# Patient Record
Sex: FEMALE | Race: WHITE | NOT HISPANIC OR LATINO | Employment: OTHER | URBAN - NONMETROPOLITAN AREA
[De-identification: names, ages, dates, MRNs, and addresses within clinical notes are randomized per-mention and may not be internally consistent; named-entity substitution may affect disease eponyms.]

---

## 2017-01-23 ENCOUNTER — HOSPITAL ENCOUNTER (INPATIENT)
Facility: HOSPITAL | Age: 72
LOS: 1 days | Discharge: HOME HEALTH | DRG: 065 | End: 2017-01-25
Admitting: FAMILY MEDICINE
Payer: MEDICARE

## 2017-01-23 DIAGNOSIS — H53.9 VISION CHANGES: ICD-10-CM

## 2017-01-23 DIAGNOSIS — E78.00 ELEVATED LDL CHOLESTEROL LEVEL: ICD-10-CM

## 2017-01-23 DIAGNOSIS — I63.319 CEREBROVASCULAR ACCIDENT (CVA) DUE TO THROMBOSIS OF MIDDLE CEREBRAL ARTERY, UNSPECIFIED BLOOD VESSEL LATERALITY (H): Primary | ICD-10-CM

## 2017-01-23 DIAGNOSIS — R40.4 TRANSIENT ALTERATION OF AWARENESS: ICD-10-CM

## 2017-01-23 PROBLEM — G45.9 TIA (TRANSIENT ISCHEMIC ATTACK): Status: ACTIVE | Noted: 2017-01-23

## 2017-01-23 LAB
ALBUMIN SERPL-MCNC: 3.9 G/DL (ref 3.4–5)
ALBUMIN UR-MCNC: 30 MG/DL
ALP SERPL-CCNC: 88 U/L (ref 40–150)
ALT SERPL W P-5'-P-CCNC: 14 U/L (ref 0–50)
ANION GAP SERPL CALCULATED.3IONS-SCNC: 10 MMOL/L (ref 3–14)
APPEARANCE UR: CLEAR
AST SERPL W P-5'-P-CCNC: 14 U/L (ref 0–45)
BACTERIA #/AREA URNS HPF: ABNORMAL /HPF
BASOPHILS # BLD AUTO: 0 10E9/L (ref 0–0.2)
BASOPHILS NFR BLD AUTO: 0.4 %
BILIRUB SERPL-MCNC: 1 MG/DL (ref 0.2–1.3)
BILIRUB UR QL STRIP: NEGATIVE
BUN SERPL-MCNC: 21 MG/DL (ref 7–30)
CALCIUM SERPL-MCNC: 9.3 MG/DL (ref 8.5–10.1)
CHLORIDE SERPL-SCNC: 102 MMOL/L (ref 94–109)
CO2 SERPL-SCNC: 26 MMOL/L (ref 20–32)
COLOR UR AUTO: YELLOW
CREAT SERPL-MCNC: 1.2 MG/DL (ref 0.52–1.04)
CRP SERPL-MCNC: 4.9 MG/L (ref 0–8)
DIFFERENTIAL METHOD BLD: NORMAL
EOSINOPHIL # BLD AUTO: 0 10E9/L (ref 0–0.7)
EOSINOPHIL NFR BLD AUTO: 0 %
ERYTHROCYTE [DISTWIDTH] IN BLOOD BY AUTOMATED COUNT: 13.3 % (ref 10–15)
GFR SERPL CREATININE-BSD FRML MDRD: 44 ML/MIN/1.7M2
GLUCOSE SERPL-MCNC: 101 MG/DL (ref 70–99)
GLUCOSE UR STRIP-MCNC: NEGATIVE MG/DL
HCT VFR BLD AUTO: 42.4 % (ref 35–47)
HGB BLD-MCNC: 14.7 G/DL (ref 11.7–15.7)
HGB UR QL STRIP: ABNORMAL
IMM GRANULOCYTES # BLD: 0 10E9/L (ref 0–0.4)
IMM GRANULOCYTES NFR BLD: 0.2 %
KETONES UR STRIP-MCNC: 5 MG/DL
LEUKOCYTE ESTERASE UR QL STRIP: NEGATIVE
LYMPHOCYTES # BLD AUTO: 1.6 10E9/L (ref 0.8–5.3)
LYMPHOCYTES NFR BLD AUTO: 19.5 %
MCH RBC QN AUTO: 30.6 PG (ref 26.5–33)
MCHC RBC AUTO-ENTMCNC: 34.7 G/DL (ref 31.5–36.5)
MCV RBC AUTO: 88 FL (ref 78–100)
MONOCYTES # BLD AUTO: 0.8 10E9/L (ref 0–1.3)
MONOCYTES NFR BLD AUTO: 9.8 %
MUCOUS THREADS #/AREA URNS LPF: PRESENT /LPF
NEUTROPHILS # BLD AUTO: 5.9 10E9/L (ref 1.6–8.3)
NEUTROPHILS NFR BLD AUTO: 70.1 %
NITRATE UR QL: NEGATIVE
NRBC # BLD AUTO: 0 10*3/UL
NRBC BLD AUTO-RTO: 0 /100
PH UR STRIP: 5.5 PH (ref 4.7–8)
PLATELET # BLD AUTO: 233 10E9/L (ref 150–450)
POTASSIUM SERPL-SCNC: 3.6 MMOL/L (ref 3.4–5.3)
PROT SERPL-MCNC: 8 G/DL (ref 6.8–8.8)
RBC # BLD AUTO: 4.8 10E12/L (ref 3.8–5.2)
RBC #/AREA URNS AUTO: 2 /HPF (ref 0–2)
SODIUM SERPL-SCNC: 138 MMOL/L (ref 133–144)
SP GR UR STRIP: 1.02 (ref 1–1.03)
SQUAMOUS #/AREA URNS AUTO: 3 /HPF (ref 0–1)
TSH SERPL DL<=0.005 MIU/L-ACNC: 2.7 MU/L (ref 0.4–4)
URN SPEC COLLECT METH UR: ABNORMAL
UROBILINOGEN UR STRIP-MCNC: NORMAL MG/DL (ref 0–2)
WBC # BLD AUTO: 8.4 10E9/L (ref 4–11)
WBC #/AREA URNS AUTO: 2 /HPF (ref 0–2)

## 2017-01-23 PROCEDURE — 96361 HYDRATE IV INFUSION ADD-ON: CPT

## 2017-01-23 PROCEDURE — 84443 ASSAY THYROID STIM HORMONE: CPT

## 2017-01-23 PROCEDURE — 25000128 H RX IP 250 OP 636: Performed by: FAMILY MEDICINE

## 2017-01-23 PROCEDURE — A9270 NON-COVERED ITEM OR SERVICE: HCPCS | Mod: GY | Performed by: FAMILY MEDICINE

## 2017-01-23 PROCEDURE — 70498 CT ANGIOGRAPHY NECK: CPT | Mod: TC

## 2017-01-23 PROCEDURE — G0378 HOSPITAL OBSERVATION PER HR: HCPCS

## 2017-01-23 PROCEDURE — 99284 EMERGENCY DEPT VISIT MOD MDM: CPT

## 2017-01-23 PROCEDURE — 25000128 H RX IP 250 OP 636

## 2017-01-23 PROCEDURE — 70496 CT ANGIOGRAPHY HEAD: CPT | Mod: TC

## 2017-01-23 PROCEDURE — 40000786 ZZHCL STATISTIC ACTIVE MRSA SURVEILLANCE CULTURE: Performed by: FAMILY MEDICINE

## 2017-01-23 PROCEDURE — 96360 HYDRATION IV INFUSION INIT: CPT

## 2017-01-23 PROCEDURE — 80053 COMPREHEN METABOLIC PANEL: CPT

## 2017-01-23 PROCEDURE — 36415 COLL VENOUS BLD VENIPUNCTURE: CPT

## 2017-01-23 PROCEDURE — 85025 COMPLETE CBC W/AUTO DIFF WBC: CPT

## 2017-01-23 PROCEDURE — 25000132 ZZH RX MED GY IP 250 OP 250 PS 637: Mod: GY | Performed by: FAMILY MEDICINE

## 2017-01-23 PROCEDURE — 93880 EXTRACRANIAL BILAT STUDY: CPT | Mod: TC

## 2017-01-23 PROCEDURE — 86140 C-REACTIVE PROTEIN: CPT

## 2017-01-23 PROCEDURE — 81001 URINALYSIS AUTO W/SCOPE: CPT

## 2017-01-23 PROCEDURE — 99285 EMERGENCY DEPT VISIT HI MDM: CPT | Mod: 25

## 2017-01-23 RX ORDER — LEVOTHYROXINE SODIUM 50 UG/1
50 TABLET ORAL DAILY
Status: DISCONTINUED | OUTPATIENT
Start: 2017-01-23 | End: 2017-01-25 | Stop reason: HOSPADM

## 2017-01-23 RX ORDER — SODIUM CHLORIDE 9 MG/ML
INJECTION, SOLUTION INTRAVENOUS CONTINUOUS
Status: DISCONTINUED | OUTPATIENT
Start: 2017-01-23 | End: 2017-01-23

## 2017-01-23 RX ORDER — FUROSEMIDE 40 MG
40 TABLET ORAL 2 TIMES DAILY
COMMUNITY

## 2017-01-23 RX ORDER — BUSPIRONE HYDROCHLORIDE 15 MG/1
30 TABLET ORAL EVERY MORNING
Status: DISCONTINUED | OUTPATIENT
Start: 2017-01-24 | End: 2017-01-25 | Stop reason: HOSPADM

## 2017-01-23 RX ORDER — ONDANSETRON 2 MG/ML
4 INJECTION INTRAMUSCULAR; INTRAVENOUS EVERY 6 HOURS PRN
Status: DISCONTINUED | OUTPATIENT
Start: 2017-01-23 | End: 2017-01-25 | Stop reason: HOSPADM

## 2017-01-23 RX ORDER — NALOXONE HYDROCHLORIDE 0.4 MG/ML
.1-.4 INJECTION, SOLUTION INTRAMUSCULAR; INTRAVENOUS; SUBCUTANEOUS
Status: DISCONTINUED | OUTPATIENT
Start: 2017-01-23 | End: 2017-01-25 | Stop reason: HOSPADM

## 2017-01-23 RX ORDER — SIMVASTATIN 40 MG
40 TABLET ORAL AT BEDTIME
Status: DISCONTINUED | OUTPATIENT
Start: 2017-01-23 | End: 2017-01-24

## 2017-01-23 RX ORDER — ALLOPURINOL 300 MG/1
300 TABLET ORAL DAILY
Status: DISCONTINUED | OUTPATIENT
Start: 2017-01-23 | End: 2017-01-25 | Stop reason: HOSPADM

## 2017-01-23 RX ORDER — ONDANSETRON 4 MG/1
4 TABLET, ORALLY DISINTEGRATING ORAL EVERY 6 HOURS PRN
Status: DISCONTINUED | OUTPATIENT
Start: 2017-01-23 | End: 2017-01-25 | Stop reason: HOSPADM

## 2017-01-23 RX ORDER — FUROSEMIDE 40 MG
40 TABLET ORAL 2 TIMES DAILY
Status: DISCONTINUED | OUTPATIENT
Start: 2017-01-23 | End: 2017-01-25 | Stop reason: HOSPADM

## 2017-01-23 RX ORDER — ACETAMINOPHEN 325 MG/1
650 TABLET ORAL EVERY 4 HOURS PRN
Status: DISCONTINUED | OUTPATIENT
Start: 2017-01-23 | End: 2017-01-25 | Stop reason: HOSPADM

## 2017-01-23 RX ORDER — SODIUM CHLORIDE 9 MG/ML
INJECTION, SOLUTION INTRAVENOUS CONTINUOUS
Status: DISCONTINUED | OUTPATIENT
Start: 2017-01-23 | End: 2017-01-24

## 2017-01-23 RX ORDER — IOPAMIDOL 612 MG/ML
100 INJECTION, SOLUTION INTRAVASCULAR ONCE
Status: COMPLETED | OUTPATIENT
Start: 2017-01-23 | End: 2017-01-23

## 2017-01-23 RX ADMIN — SIMVASTATIN 40 MG: 40 TABLET, FILM COATED ORAL at 21:21

## 2017-01-23 RX ADMIN — SERTRALINE HYDROCHLORIDE 50 MG: 50 TABLET ORAL at 19:06

## 2017-01-23 RX ADMIN — SODIUM CHLORIDE: 9 INJECTION, SOLUTION INTRAVENOUS at 11:48

## 2017-01-23 RX ADMIN — FUROSEMIDE 40 MG: 40 TABLET ORAL at 21:21

## 2017-01-23 RX ADMIN — LEVOTHYROXINE SODIUM 50 MCG: 50 TABLET ORAL at 19:05

## 2017-01-23 RX ADMIN — SODIUM CHLORIDE: 9 INJECTION, SOLUTION INTRAVENOUS at 21:45

## 2017-01-23 RX ADMIN — ALLOPURINOL 300 MG: 300 TABLET ORAL at 19:05

## 2017-01-23 RX ADMIN — IOPAMIDOL 75 ML: 612 INJECTION, SOLUTION INTRAVASCULAR at 13:02

## 2017-01-23 ASSESSMENT — ENCOUNTER SYMPTOMS
NECK STIFFNESS: 0
COLOR CHANGE: 0
DIFFICULTY URINATING: 0
ARTHRALGIAS: 0
HEADACHES: 0
FEVER: 0
ABDOMINAL PAIN: 0
EYE REDNESS: 0
CONFUSION: 1
SPEECH DIFFICULTY: 1
SHORTNESS OF BREATH: 0

## 2017-01-23 ASSESSMENT — VISUAL ACUITY: OU: NORMAL ACUITY

## 2017-01-23 NOTE — IP AVS SNAPSHOT
[facility]    [address] [place] [id number]    Phone:  [phone]    Fax:  [phone]                                       After Visit Summary   1/23/2017    Vanesa Flemign    MRN: [id number]           After Visit Summary Signature Page     I have received my discharge instructions, and my questions have been answered. I have discussed any challenges I see with this plan with the nurse or doctor.    ..........................................................................................................................................  Patient/Patient Representative Signature      ..........................................................................................................................................  Patient Representative Print Name and Relationship to Patient    ..................................................               ................................................  Date                                            Time    ..........................................................................................................................................  Reviewed by Signature/Title    ...................................................              ..............................................  Date                                                            Time

## 2017-01-23 NOTE — IP AVS SNAPSHOT
MRN:[id number]                      After Visit Summary   1/23/2017    Vanesa Fleming    MRN: [id number]           Thank you!     Thank you for choosing [facility] for your care. Our goal is always to provide you with excellent care. Hearing back from our patients is one way we can continue to improve our services. Please take a few minutes to complete the written survey that you may receive in the mail after you visit with us. Thank you!        Patient Information     Date Of Birth          1945        About your hospital stay     You were admitted on:  January 23, 2017 You last received care in the:  [facility]    You were discharged on:  January 25, 2017       Who to Call     For medical emergencies, please call 911.  For non-urgent questions about your medical care, please call your primary care provider or clinic, [phone]          Attending Provider     Provider    Roberta Mccain APRN FNP Hilde-Philips, Amy K, MD       Primary Care Provider Office Phone # Fax #    Sandra Vanegas [phone]       [facility] [address] [facility] [address]        Follow-up Appointments     Follow-up and recommended labs and tests        F/u sandra vanegas 1 wk                  Additional Services     Home Care SLP Referral for Hospital Discharge       SLP to eval and treat    Your provider has ordered home care - speech therapy. If you have not been contacted within 2 days of your discharge please call the department phone number listed on the top of this document.            Home care nursing referral       RN extended hours visit. RN to assess respiratory and cardiac status and patients ability to take and record daily blood pressure, temp and weight.  RN to teach n/a.    Your provider has ordered home care nursing services. If you have not been contacted within 2 days of your discharge please call the inpatient department phone number at  [phone] .                  Further instructions from your care team       What to expect when you have contrast    During your exam, we will inject  contrast  into your vein or artery. (Contrast is a clear liquid with iodine in it. It shows up on X-rays.)    You may feel warm or hot. You may have a metal taste in your mouth and a slight upset stomach. You may also feel pressure near the kidneys and bladder. These effects will last about 1 to 3 minutes.    Please tell us if you have:    Sneezing     Itching    Hives     Swelling in the face    A hoarse voice    Breathing problems    Other new symptoms    Serious problems are rare.  They may include:    Irregular heartbeat     Seizures    Kidney failure              Tissue damage    Shock      Death    If you have any problems during the exam, we  will treat them right away.    When you get home    Call your hospital if you have any new symptoms in the next 2 days, like hives or swelling. (Phone numbers are at the bottom of this page.) Or call your family doctor.     If you have wheezing or trouble breathing, call 911.    Self-care  -Drink at least 4 extra glasses of water today.   This reduces the stress on your kidneys.  -Keep taking your regular medicines.    The contrast will pass out of your body in your  Urine(pee). This will happen in the next 24 hours. You  will not feel this. Your urine will not  change color.    If you have kidney problems or take metformin    Drink 4 to 8 large glasses of water for the next  2 days, if you are not on a fluid restriction.    ?If you take metformin (Glucophage or Glucovance) for diabetes, keep taking it.      ?Your kidney function tests are abnormal.  If you take Metformin, do not take it for 48 hours. Please go to your clinic for a blood test within 3 days after your exam before the restarting this medicine.     (Note to provider:please give patient prescription for lab tests.)    ?Special instructions: ***    I have  read and understand the above information.    Patient Sign Here:______________________________________Date:________Time:______    Staff Sign Here:________________________________________Date:_______Time:______      Radiology Departments:     ?[facility]: [phone] ?[facility]: [phone]     ?[facility]: [phone] ?[facility]:[phone]      ?[facility]: [phone]  ?[facility]: [phone]  ?[facility]:[phone]    ?[facility]:[phone]  ?[facility]:[phone] Contrast Discharge Instructions    The IV contrast you received today will pass out of your body in your  urine. This will happen in the next 24 hours. You will not feel this process.  Your urine will not change color.    Drink at least 4 extra glasses of water or juice today (unless your doctor  has restricted your fluids). This reduces the stress on your kidneys.  You may take your regular medicines.    If you are on dialysis: It is best to have dialysis today.    If you have a reaction: Most reactions happen right away. If you have  any new symptoms after leaving the hospital (such as hives or swelling),  call your hospital at the correct number below. Or call your family doctor.  If you have breathing distress or wheezing, call 911.    Special instructions: ***    I have read and understand the above information.    Signature:______________________________________ Date:___3-61-42________    Staff:__________________________________________ Date:___________  You have a follow up appointment with Felipa Steven on  at 10:40 in the morning thank you.    Time:__________    [facility] Departments:    ___[facility]: [phone]  ___[facility]: [phone]  ___[facility]: [phone] ___[facility]: [phone]  ___[facility]: [phone]  ___[facility]: [phone]  ___[phone]  ___[facility]: [phone]  ___[phone]    Pending Results     Date and Time Order Name  "Status Description    2017 1816 Echocardiogram Preliminary             Statement of Approval     Ordered          17 0742  I have reviewed and agree with all the recommendations and orders detailed in this document.   EFFECTIVE NOW     Comments:  F/u sandra QUINTANILLA 1 wk   Approved and electronically signed by:  Ana Ryan MD             Admission Information        Provider Department Dept Phone    2017 Ana Ryan MD [facility] [phone]      Your Vitals Were     Blood Pressure Pulse Temperature    153/82 mmHg 72 96.7  F (35.9  C) (Tympanic)    Respirations Height Weight    16 1.524 m (5') 67.9 kg (149 lb 11.1 oz)    BMI (Body Mass Index) Pulse Oximetry       29.23 kg/m2 95%       MyChart Information     [employer] lets you send messages to your doctor, view your test results, renew your prescriptions, schedule appointments and more. To sign up, go to [email or web address] . Click on \"Log in\" on the left side of the screen, which will take you to the Welcome page. Then click on \"Sign up Now\" on the right side of the page.     You will be asked to enter the access code listed below, as well as some personal information. Please follow the directions to create your username and password.     Your access code is: [email or web address]  Expires: 2017  8:50 AM     Your access code will  in 90 days. If you need help or a new code, please call your [facility] clinic or [phone].        Care EveryWhere ID     This is your Care EveryWhere ID. This could be used by other organizations to access your [facility] medical records  [id number]           Review of your medicines      START taking        Dose / Directions    atorvastatin 80 MG tablet   Commonly known as:  LIPITOR   Used for:  Elevated LDL cholesterol level        Dose:  80 mg   Take 1 tablet (80 mg) by mouth daily   Quantity:  90 tablet   Refills:  3         CONTINUE these medicines which may have CHANGED, or have new " prescriptions. If we are uncertain of the size of tablets/capsules you have at home, strength may be listed as something that might have changed.        Dose / Directions    aspirin 325 MG tablet   This may have changed:    - medication strength  - how much to take   Used for:  Cerebrovascular accident (CVA) due to thrombosis of middle cerebral artery, unspecified blood vessel laterality (H)        Dose:  325 mg   Take 1 tablet (325 mg) by mouth daily   Refills:  0         CONTINUE these medicines which have NOT CHANGED        Dose / Directions    ALLOPURINOL PO        Dose:  300 mg   Take 300 mg by mouth daily   Refills:  0       BUSPIRONE HCL PO        Dose:  30 mg   Take 30 mg by mouth   Refills:  0       LASIX 40 MG tablet   Generic drug:  furosemide        Dose:  40 mg   Take 40 mg by mouth 2 times daily   Refills:  0       LEVOTHYROXINE SODIUM PO        Dose:  50 mcg   Take 50 mcg by mouth daily   Refills:  0       METOPROLOL SUCCINATE ER PO        Dose:  50 mg   Take 50 mg by mouth daily   Refills:  0       SERTRALINE HCL PO        Dose:  50 mg   Take 50 mg by mouth daily   Refills:  0         STOP taking     LISINOPRIL PO           SIMVASTATIN PO                Where to get your medicines      These medications were sent to [id number] - [place], [place] - [address] AT [facility] [address], [facility] [address]     Phone:  [phone]    - atorvastatin 80 MG tablet      Some of these will need a paper prescription and others can be bought over the counter. Ask your nurse if you have questions.     You don't need a prescription for these medications    - aspirin 325 MG tablet             Protect others around you: Learn how to safely use, store and throw away your medicines at www.disposemymeds.org.             Medication List: This is a list of all your medications and when to take them. Check marks below indicate your daily home schedule. Keep this list as a reference.       Medications           Morning Afternoon Evening Bedtime As Needed    ALLOPURINOL PO   Take 300 mg by mouth daily   Last time this was given:  300 mg on 1/25/2017  8:34 AM                                aspirin 325 MG tablet   Take 1 tablet (325 mg) by mouth daily                                atorvastatin 80 MG tablet   Commonly known as:  LIPITOR   Take 1 tablet (80 mg) by mouth daily   Last time this was given:  80 mg on 1/24/2017  8:36 PM                                BUSPIRONE HCL PO   Take 30 mg by mouth   Last time this was given:  30 mg on 1/25/2017  8:33 AM                                LASIX 40 MG tablet   Take 40 mg by mouth 2 times daily   Last time this was given:  40 mg on 1/25/2017  8:34 AM   Generic drug:  furosemide                                LEVOTHYROXINE SODIUM PO   Take 50 mcg by mouth daily   Last time this was given:  50 mcg on 1/25/2017  9:46 AM                                METOPROLOL SUCCINATE ER PO   Take 50 mg by mouth daily                                SERTRALINE HCL PO   Take 50 mg by mouth daily   Last time this was given:  50 mg on 1/25/2017  8:34 AM                                          More Information        Altered Level of Consciousness  Level of consciousness (LOC) is a measure of a person s ability to interact with other people and to react to the surroundings. A person with an altered level of consciousness may not respond to touch or voices. He or she may look vacant or blank and may not make eye contact with others. He or she may be limp and may not move for a long time or show little interest in moving. He or she may also be confused.  There are many causes of altered LOC. They include low blood sugar, infection, medicines, injuries, or other medical problems    Altered LOC is a medical emergency. The healthcare provider will do tests to help find the cause. These may include blood tests and imaging tests. The person is treated so breathing and heart rate are  stable. An intravenous (IV) line may be put into a vein in the arm or hand to give medicines. Once the cause of altered LOC is found, the goal is to treat the cause.   In almost all cases, the person will be admitted to the hospital for observation.  Home care  When your loved one is released from the hospital, you will be given guidelines for caring for him or her. In general:    Follow the healthcare provider's instructions for giving any prescribed medicines to your child.     Stay with your loved one or have another responsible adult look after him or her. Watch carefully for any return of symptoms or changes in behavior.    If the person has diabetes, make sure that any approved medicines are given on time and as prescribed.  Follow-up care  Follow up with the healthcare provider or our staff.  When to seek medical advice  Call the healthcare provider right away for any of the following:    Symptoms of altered LOC return    New symptoms appear    3890-1539 [employer]. [address] [place] [address]. All rights reserved. This information is not intended as a substitute for professional medical care. Always follow your healthcare professional's instructions.                Stroke (Completed)    You have had a mild stroke, or cerebrovascular accident (CVA). This is caused by a loss of blood flow to part of your brain. This can occur when a blood clot forms inside the carotid artery (main artery from the heart to the brain) or inside the heart. When the clot travels to the brain, it can lodge in a blood vessel and block blood flow. The other common cause of stroke is a gradual narrowing of the arteries in the brain due to buildup of fatty deposits (plaque).  Symptoms  Blocked blood flow in different areas of the brain can cause different symptoms. If you have had a stroke before, a new one may be different. A memory aid for the basic signs of a stroke is F.A.S.T.  F.A.S.T.    F: Face  drooping, or numbness on one side. This may be more noticeable when you ask the affected person to smile.    A: Arm weakness or numbness. The affected person may have trouble using or lifting one side.    S: Speech difficulty. Speech may be slurred or hard to understand. The affected person may also use the wrong words.    T: Time to call 911. Time is critical in treating a stroke. Call 911 as soon as you suspect a stroke has happened--even a small one. The sooner treatment is started the better, even if the symptoms go away.  Other common symptoms of a stroke include:    Having difficulty getting the right words to come out    Weakness in one leg    Numbness on one side    Difficulty walking    Trouble with coordination    Trouble with vision    Headache    Confusion    Dizziness  Treatment  After you have had a stroke, you are at risk of having another. Be sure to follow up with your healthcare provider for further evaluation and treatment. If problems are found, your healthcare provider will recommend treatment with medicines and/or procedures.  To reduce your chance of having another stroke, you may be prescribed medicines. These include medicines to prevent blood clots, such as antiplatelet or anticoagulant medicines.  Home care    Rest at home and avoid exertion for the next few days.    If your healthcare provider has prescribed medicines, take them as directed.  Follow-up care  Follow up with your healthcare provider, or as advised. Additional tests may be needed. If you had an X-ray, CT scan, MRI, or ECG (electrocardiogram), it will be reviewed by a specialist. You will be notified of any new findings that will affect your care.  Call 911   Contact emergency services right away if any of these occur:    Any of your stroke symptoms worsen    New problems with speech, confusion, vision, walking, coordination, facial droop, or weakness or numbness on one side of your body    Severe headache, fainting spell,  dizziness, or seizure    Chest pain or shortness of breath  Remember F.A.S.T. (described above). If you notice warning signs and symptoms of stroke, CALL 911 without delay.    7046-8800 [employer]. [address] [place] [address], [place] [address]. All rights reserved. This information is not intended as a substitute for professional medical care. Always follow your healthcare professional's instructions.                Atorvastatin Calcium Oral tablet  What is this medicine?  ATORVASTATIN (a TORE va sta tin) is known as a HMG-CoA reductase inhibitor or 'statin'. It lowers the level of cholesterol and triglycerides in the blood. This drug may also reduce the risk of heart attack, stroke, or other health problems in patients with risk factors for heart disease. Diet and lifestyle changes are often used with this drug.  This medicine may be used for other purposes; ask your health care provider or pharmacist if you have questions.  What should I tell my health care provider before I take this medicine?  They need to know if you have any of these conditions:    frequently drink alcoholic beverages    history of stroke, TIA    kidney disease    liver disease    muscle aches or weakness    other medical condition    an unusual or allergic reaction to atorvastatin, other medicines, foods, dyes, or preservatives    pregnant or trying to get pregnant    breast-feeding  How should I use this medicine?  Take this medicine by mouth with a glass of water. Follow the directions on the prescription label. You can take this medicine with or without food. Take your doses at regular intervals. Do not take your medicine more often than directed.  Talk to your pediatrician regarding the use of this medicine in children. While this drug may be prescribed for children as young as 10 years old for selected conditions, precautions do apply.  Overdosage: If you think you have taken too much of this medicine contact a poison control  center or emergency room at once.  NOTE: This medicine is only for you. Do not share this medicine with others.  What if I miss a dose?  If you miss a dose, take it as soon as you can. If it is almost time for your next dose, take only that dose. Do not take double or extra doses.  What may interact with this medicine?  Do not take this medicine with any of the following medications:    red yeast rice    telaprevir    telithromycin    voriconazole  This medicine may also interact with the following medications:    alcohol    antiviral medicines for HIV or AIDS    boceprevir    certain antibiotics like clarithromycin, erythromycin, troleandomycin    certain medicines for cholesterol like fenofibrate or gemfibrozil    cimetidine    clarithromycin    colchicine    cyclosporine    digoxin    female hormones, like estrogens or progestins and birth control pills    grapefruit juice    medicines for fungal infections like fluconazole, itraconazole, ketoconazole    niacin    rifampin    spironolactone  This list may not describe all possible interactions. Give your health care provider a list of all the medicines, herbs, non-prescription drugs, or dietary supplements you use. Also tell them if you smoke, drink alcohol, or use illegal drugs. Some items may interact with your medicine.  What should I watch for while using this medicine?  Visit your doctor or health care professional for regular check-ups. You may need regular tests to make sure your liver is working properly.  Tell your doctor or health care professional right away if you get any unexplained muscle pain, tenderness, or weakness, especially if you also have a fever and tiredness. Your doctor or health care professional may tell you to stop taking this medicine if you develop muscle problems. If your muscle problems do not go away after stopping this medicine, contact your health care professional.  This drug is only part of a total heart-health program. Your  doctor or a dietician can suggest a low-cholesterol and low-fat diet to help. Avoid alcohol and smoking, and keep a proper exercise schedule.  Do not use this drug if you are pregnant or breast-feeding. Serious side effects to an unborn child or to an infant are possible. Talk to your doctor or pharmacist for more information.  This medicine may affect blood sugar levels. If you have diabetes, check with your doctor or health care professional before you change your diet or the dose of your diabetic medicine.  If you are going to have surgery tell your health care professional that you are taking this drug.  What side effects may I notice from receiving this medicine?  Side effects that you should report to your doctor or health care professional as soon as possible:    allergic reactions like skin rash, itching or hives, swelling of the face, lips, or tongue    dark urine    fever    joint pain    muscle cramps, pain    redness, blistering, peeling or loosening of the skin, including inside the mouth    trouble passing urine or change in the amount of urine    unusually weak or tired    yellowing of eyes or skin  Side effects that usually do not require medical attention (report to your doctor or health care professional if they continue or are bothersome):    constipation    heartburn    stomach gas, pain, upset  This list may not describe all possible side effects. Call your doctor for medical advice about side effects. You may report side effects to FDA at 9-556-FDA-2502.  Where should I keep my medicine?  Keep out of the reach of children.  Store at room temperature between 20 to 25 degrees C (68 to 77 degrees F). Throw away any unused medicine after the expiration date.  NOTE:This sheet is a summary. It may not cover all possible information. If you have questions about this medicine, talk to your doctor, pharmacist, or health care provider. Copyright  2016 [employer]                Patient  Education    Aspirin Chewable tablet    Aspirin Chewing-gum, medicated    Aspirin Gastro-resistant tablet    Aspirin Oral tablet    Aspirin Oral tablet, extended-release    Aspirin Rectal suppository  Aspirin Oral tablet  What is this medicine?  ASPIRIN (AS pir in) is a pain reliever. It is used to treat mild pain and fever. This medicine is also used as directed by a doctor to prevent and to treat heart attacks, to prevent strokes, and to treat arthritis or inflammation.  This medicine may be used for other purposes; ask your health care provider or pharmacist if you have questions.  What should I tell my health care provider before I take this medicine?  They need to know if you have any of these conditions:    anemia    asthma    bleeding problems    child with chickenpox, the flu, or other viral infection    diabetes    gout    if you frequently drink alcohol containing drinks    kidney disease    liver disease    low level of vitamin K    lupus    smoke tobacco    stomach ulcers or other problems    an unusual or allergic reaction to aspirin, tartrazine dye, other medicines, dyes, or preservatives    pregnant or trying to get pregnant    breast-feeding  How should I use this medicine?  Take this medicine by mouth with a glass of water. Follow the directions on the package or prescription label. You can take this medicine with or without food. If it upsets your stomach, take it with food. Do not take your medicine more often than directed.  Talk to your pediatrician regarding the use of this medicine in children. While this drug may be prescribed for children as young as 12 years of age for selected conditions, precautions do apply. Children and teenagers should not use this medicine to treat chicken pox or flu symptoms unless directed by a doctor.  Patients over 65 years old may have a stronger reaction and need a smaller dose.  Overdosage: If you think you have taken too much of this medicine contact a poison  control center or emergency room at once.  NOTE: This medicine is only for you. Do not share this medicine with others.  What if I miss a dose?  If you are taking this medicine on a regular schedule and miss a dose, take it as soon as you can. If it is almost time for your next dose, take only that dose. Do not take double or extra doses.  What may interact with this medicine?  Do not take this medicine with any of the following medications:    cidofovir    ketorolac    probenecid  This medicine may also interact with the following medications:    alcohol    alendronate    bismuth subsalicylate    flavocoxid    herbal supplements like feverfew, garlic, cris, ginkgo biloba, horse chestnut    medicines for diabetes or glaucoma like acetazolamide, methazolamide    medicines for gout    medicines that treat or prevent blood clots like enoxaparin, heparin, ticlopidine, warfarin    other aspirin and aspirin-like medicines    NSAIDs, medicines for pain and inflammation, like ibuprofen or naproxen    pemetrexed    sulfinpyrazone    varicella live vaccine  This list may not describe all possible interactions. Give your health care provider a list of all the medicines, herbs, non-prescription drugs, or dietary supplements you use. Also tell them if you smoke, drink alcohol, or use illegal drugs. Some items may interact with your medicine.  What should I watch for while using this medicine?  If you are treating yourself for pain, tell your doctor or health care professional if the pain lasts more than 10 days, if it gets worse, or if there is a new or different kind of pain. Tell your doctor if you see redness or swelling. Also, check with your doctor if you have a fever that lasts for more than 3 days. Only take this medicine to prevent heart attacks or blood clotting if prescribed by your doctor or health care professional.  Do not take aspirin or aspirin-like medicines with this medicine. Too much aspirin can be  dangerous. Always read the labels carefully.  This medicine can irritate your stomach or cause bleeding problems. Do not smoke cigarettes or drink alcohol while taking this medicine. Do not lie down for 30 minutes after taking this medicine to prevent irritation to your throat.  If you are scheduled for any medical or dental procedure, tell your healthcare provider that you are taking this medicine. You may need to stop taking this medicine before the procedure.  This medicine may be used to treat migraines. If you take migraine medicines for 10 or more days a month, your migraines may get worse. Keep a diary of headache days and medicine use. Contact your healthcare professional if your migraine attacks occur more frequently.  What side effects may I notice from receiving this medicine?  Side effects that you should report to your doctor or health care professional as soon as possible:    allergic reactions like skin rash, itching or hives, swelling of the face, lips, or tongue    breathing problems    changes in hearing, ringing in the ears    confusion    general ill feeling or flu-like symptoms    pain on swallowing    redness, blistering, peeling or loosening of the skin, including inside the mouth or nose    signs and symptoms of bleeding such as bloody or black, tarry stools; red or dark-brown urine; spitting up blood or brown material that looks like coffee grounds; red spots on the skin; unusual bruising or bleeding from the eye, gums, or nose    trouble passing urine or change in the amount of urine    unusually weak or tired    yellowing of the eyes or skin  Side effects that usually do not require medical attention (report to your doctor or health care professional if they continue or are bothersome):    diarrhea or constipation    headache    nausea, vomiting    stomach gas, heartburn  This list may not describe all possible side effects. Call your doctor for medical advice about side effects. You may  report side effects to FDA at 9-684-FDA-6732.  Where should I keep my medicine?  Keep out of the reach of children.  Store at room temperature between 15 and 30 degrees C (59 and 86 degrees F). Protect from heat and moisture. Do not use this medicine if it has a strong vinegar smell. Throw away any unused medicine after the expiration date.  NOTE:This sheet is a summary. It may not cover all possible information. If you have questions about this medicine, talk to your doctor, pharmacist, or health care provider. Copyright  2016 Gold Standard

## 2017-01-23 NOTE — ED NOTES
"[facility] notified that pt was unable to see peripherally out of right side and that she was unable to write anything for registration; family stated this was not her \"norm\"  "

## 2017-01-23 NOTE — PROGRESS NOTES
Preliminary results for STAT imaging were received at 1355 (time on fax or preliminary report).    Preliminary results for STAT imaging were given to [facility] (time) and scanned into PACs at 1355 (time).

## 2017-01-23 NOTE — ED PROVIDER NOTES
History     Chief Complaint   Patient presents with     Diarrhea     Per son states pt said she has been sick the last couple days.  told him diarrhea. Pt denies sx other than is confused.      HPI  Vanesa Fleming is a 71 year old female, resident of [facility], new to this facility, presents to ED via EMS for evaluation of acute onset visual changes, confusion. Son, at bedside, states he visited his mother 2 days ago, at that time was in her normal state of health, other than c/o diarrhea. Today son noted confusion, difficulty finding words, unable to write, right sided peripheral visual change. Pt denies pain, no fever, no cough or cold sx.     I have reviewed the Medications, Allergies, Past Medical and Surgical History, and Social History in the Epic system.    Review of Systems   Constitutional: Negative for fever.   HENT: Negative for congestion.    Eyes: Positive for visual disturbance. Negative for redness.   Respiratory: Negative for shortness of breath.    Cardiovascular: Negative for chest pain.   Gastrointestinal: Positive for diarrhea. Negative for abdominal pain.   Genitourinary: Negative for difficulty urinating.   Musculoskeletal: Negative for arthralgias and neck stiffness.   Skin: Negative for color change.   Neurological: Positive for speech difficulty. Negative for headaches.        Searching for words   Psychiatric/Behavioral: Positive for confusion.       Physical Exam   BP: (!) 189/95 mmHg  Pulse: 84  Heart Rate: 81  Temp: 100  F (37.8  C)  Resp: 18  SpO2: 98 %  Physical Exam   Constitutional: No distress.   HENT:   Head: Normocephalic.   Eyes: Conjunctivae and EOM are normal. Pupils are equal, round, and reactive to light.       Right lateral visual deficit   Neck: Normal range of motion. Neck supple.   Cardiovascular: Normal rate and regular rhythm.    Pulmonary/Chest: Effort normal and breath sounds normal. No respiratory distress.   Abdominal: Soft. Bowel sounds are normal. She  exhibits no distension.   Musculoskeletal: Normal range of motion.   Neurological: She is alert. No cranial nerve deficit or sensory deficit. GCS eye subscore is 4. GCS verbal subscore is 5. GCS motor subscore is 6.   Skin: Skin is warm and dry. She is not diaphoretic.   Psychiatric: Her mood appears anxious. Her speech is tangential. Cognition and memory are impaired. She exhibits abnormal recent memory.   Nursing note and vitals reviewed.      ED Course   Procedures     Labs Ordered and Resulted from Time of ED Arrival Up to the Time of Departure from the ED   UA MACROSCOPIC WITH REFLEX TO MICRO AND CULTURE - Abnormal; Notable for the following:     Ketones Urine 5 (*)     Blood Urine Trace (*)     Protein Albumin Urine 30 (*)     Bacteria Urine Few (*)     Squamous Epithelial /HPF Urine 3 (*)     Mucous Urine Present (*)     All other components within normal limits   COMPREHENSIVE METABOLIC PANEL - Abnormal; Notable for the following:     Glucose 101 (*)     Creatinine 1.20 (*)     GFR Estimate 44 (*)     GFR Estimate If Black 53 (*)     All other components within normal limits   CBC WITH PLATELETS DIFFERENTIAL   CRP INFLAMMATION   TSH WITH FREE T4 REFLEX   ROUTINE UA WITH MICROSCOPIC     FINDINGS:  The brachiocephalic artery is widely patent.  The right  vertebral artery appears normal.  The right common carotid artery is  tortuous.  There is heavy calcific plaquing seen in the proximal  internal carotid artery with a minimal luminal diameter of 2.3 mm.  The internal carotid artery beyond the bifurcation measured 5 mm.  This would indicate 50% stenosis in the proximal internal carotid  artery.  The internal carotid artery is widely patent to the skull  base.    The left common carotid artery is tortuous at the left carotid  bifurcation.  In the internal carotid artery minimal luminal diameter  measured 1.9 mm.  Beyond the bifurcation the internal carotid artery  measures 6 mm.  This would be consistent with  approximately 70%  stenosis in the proximal internal carotid artery.  The left subclavian  artery is widely patent.  The left vertebral artery is tortuous  proximally and has some mild calcific plaquing with less than 50%  stenosis.    The apices of the lungs are clear.  The thyroid and larynx appear  normal.  Cervical lymph nodes appear normal.  Parotid and  submandibular glands appear normal.  Paranasal sinuses are clear.    CT ANGIOGRAM OF THE HEAD    FINDINGS:  CT angiogram of the brain reveals the distal vertebral,  basilar, superior cerebellar and posterior cerebral branches to be  normal.  Both carotid siphons demonstrate mild plaquing.  The  horizontal portions of the anterior and middle cerebral artery appear  normal.  No intracranial stenoses, aneurysms or vascular shifts are  noted.    The ventricular shift is normal in size.  There are no masses,  ventricular shifts or extracerebral collections.    IMPRESSION:  NEGATIVE CT ANGIOGRAM OF THE BRAIN.  THERE IS A 50%  STENOSIS IN THE PROXIMAL INTERNAL CAROTID ARTERY ON THE RIGHT AND 70%  STENOSIS IN THE PROXIMAL INTERNAL CAROTID ARTERY ON THE LEFT.        SIGNATURE PAGE ONLY  Exam Date: Jan 23, 2017 01:15:00 PM  Author: NAS POWERS    Assessments & Plan (with Medical Decision Making)B/P 189/95,    Pt presents with acute onset confusion, word searching, right lateral eye vision loss, difficulty following simple commands. B/P 189/95, remaining VSS. Exam as above. Labs fairly unremarkable, mild renal insufficiency. CT angio head and neck without acute findings.   Findings suggestive of TIA vs CVA. Discussed case with Dr. Ryan who accepted admission for further observation, testing. Pt and family verbalize understanding and agree with plan.  Pt transported to floor via cart in stable condition.   I have reviewed the nursing notes.    I have reviewed the findings, diagnosis, plan and need for follow up with the patient.    Discharge Medication List as  of 1/25/2017 10:06 AM      START taking these medications    Details   atorvastatin (LIPITOR) 80 MG tablet Take 1 tablet (80 mg) by mouth daily, Disp-90 tablet, R-3, E-Prescribe             Final diagnoses:   Transient alteration of awareness   Vision changes       1/23/2017   [facility]      Roberta Mccain APRN FNNICOLASA  01/28/17 0956

## 2017-01-23 NOTE — ED NOTES
Family feels pt has improved. Oriented to person/place/family/current president. Couldnot state the month, day of week or year. Able to state her . Continues to deny pain. IV infusing without inflammation in right hand # 22. IV lock in place Left AC.

## 2017-01-24 ENCOUNTER — APPOINTMENT (OUTPATIENT)
Dept: OCCUPATIONAL THERAPY | Facility: HOSPITAL | Age: 72
DRG: 065 | End: 2017-01-24
Attending: FAMILY MEDICINE
Payer: MEDICARE

## 2017-01-24 ENCOUNTER — APPOINTMENT (OUTPATIENT)
Dept: ULTRASOUND IMAGING | Facility: HOSPITAL | Age: 72
DRG: 065 | End: 2017-01-24
Payer: MEDICARE

## 2017-01-24 ENCOUNTER — APPOINTMENT (OUTPATIENT)
Dept: SPEECH THERAPY | Facility: HOSPITAL | Age: 72
DRG: 065 | End: 2017-01-24
Attending: FAMILY MEDICINE
Payer: MEDICARE

## 2017-01-24 ENCOUNTER — APPOINTMENT (OUTPATIENT)
Dept: PHYSICAL THERAPY | Facility: HOSPITAL | Age: 72
DRG: 065 | End: 2017-01-24
Attending: FAMILY MEDICINE
Payer: MEDICARE

## 2017-01-24 PROBLEM — I63.9 CVA (CEREBRAL VASCULAR ACCIDENT) (H): Status: ACTIVE | Noted: 2017-01-24

## 2017-01-24 PROBLEM — I10 BENIGN ESSENTIAL HYPERTENSION: Status: ACTIVE | Noted: 2017-01-24

## 2017-01-24 PROBLEM — G31.84 MILD COGNITIVE IMPAIRMENT WITH MEMORY LOSS: Chronic | Status: ACTIVE | Noted: 2017-01-24

## 2017-01-24 PROBLEM — F33.0 MILD RECURRENT MAJOR DEPRESSION (H): Chronic | Status: ACTIVE | Noted: 2017-01-24

## 2017-01-24 PROBLEM — E78.00 ELEVATED LDL CHOLESTEROL LEVEL: Chronic | Status: ACTIVE | Noted: 2017-01-24

## 2017-01-24 LAB
CHOLEST SERPL-MCNC: 268 MG/DL
HDLC SERPL-MCNC: 49 MG/DL
LDLC SERPL CALC-MCNC: 192 MG/DL
NONHDLC SERPL-MCNC: 219 MG/DL
TRIGL SERPL-MCNC: 136 MG/DL

## 2017-01-24 PROCEDURE — 12000000 ZZH R&B MED SURG/OB

## 2017-01-24 PROCEDURE — 97112 NEUROMUSCULAR REEDUCATION: CPT | Mod: GP | Performed by: PHYSICAL THERAPIST

## 2017-01-24 PROCEDURE — 92523 SPEECH SOUND LANG COMPREHEN: CPT | Mod: GN

## 2017-01-24 PROCEDURE — 25000132 ZZH RX MED GY IP 250 OP 250 PS 637: Mod: GY | Performed by: FAMILY MEDICINE

## 2017-01-24 PROCEDURE — 93306 TTE W/DOPPLER COMPLETE: CPT | Mod: 26 | Performed by: INTERNAL MEDICINE

## 2017-01-24 PROCEDURE — A9585 GADOBUTROL INJECTION: HCPCS | Mod: TC

## 2017-01-24 PROCEDURE — G0378 HOSPITAL OBSERVATION PER HR: HCPCS

## 2017-01-24 PROCEDURE — 40000133 ZZH STATISTIC OT WARD VISIT

## 2017-01-24 PROCEDURE — A9270 NON-COVERED ITEM OR SERVICE: HCPCS | Mod: GY | Performed by: FAMILY MEDICINE

## 2017-01-24 PROCEDURE — 80061 LIPID PANEL: CPT | Performed by: FAMILY MEDICINE

## 2017-01-24 PROCEDURE — 93306 TTE W/DOPPLER COMPLETE: CPT | Mod: TC

## 2017-01-24 PROCEDURE — 70553 MRI BRAIN STEM W/O & W/DYE: CPT | Mod: TC

## 2017-01-24 PROCEDURE — 97165 OT EVAL LOW COMPLEX 30 MIN: CPT | Mod: GO

## 2017-01-24 PROCEDURE — 97161 PT EVAL LOW COMPLEX 20 MIN: CPT | Mod: GP | Performed by: PHYSICAL THERAPIST

## 2017-01-24 PROCEDURE — 40000193 ZZH STATISTIC PT WARD VISIT: Performed by: PHYSICAL THERAPIST

## 2017-01-24 PROCEDURE — 40000225 ZZH STATISTIC SLP WARD VISIT

## 2017-01-24 RX ORDER — GADOBUTROL 604.72 MG/ML
7.5 INJECTION INTRAVENOUS ONCE
Status: COMPLETED | OUTPATIENT
Start: 2017-01-24 | End: 2017-01-24

## 2017-01-24 RX ORDER — ATORVASTATIN CALCIUM 40 MG/1
80 TABLET, FILM COATED ORAL
Status: DISCONTINUED | OUTPATIENT
Start: 2017-01-24 | End: 2017-01-25 | Stop reason: HOSPADM

## 2017-01-24 RX ADMIN — BUSPIRONE HYDROCHLORIDE 30 MG: 15 TABLET ORAL at 09:07

## 2017-01-24 RX ADMIN — GADOBUTROL 7.5 ML: 604.72 INJECTION INTRAVENOUS at 08:18

## 2017-01-24 RX ADMIN — LEVOTHYROXINE SODIUM 50 MCG: 50 TABLET ORAL at 11:29

## 2017-01-24 RX ADMIN — FUROSEMIDE 40 MG: 40 TABLET ORAL at 20:36

## 2017-01-24 RX ADMIN — ALLOPURINOL 300 MG: 300 TABLET ORAL at 09:06

## 2017-01-24 RX ADMIN — ATORVASTATIN CALCIUM 80 MG: 40 TABLET, FILM COATED ORAL at 20:36

## 2017-01-24 RX ADMIN — FUROSEMIDE 40 MG: 40 TABLET ORAL at 09:06

## 2017-01-24 RX ADMIN — SERTRALINE HYDROCHLORIDE 50 MG: 50 TABLET ORAL at 09:06

## 2017-01-24 ASSESSMENT — ACTIVITIES OF DAILY LIVING (ADL): PREVIOUS_RESPONSIBILITIES: MEAL PREP;HOUSEKEEPING;LAUNDRY;SHOPPING;MEDICATION MANAGEMENT;FINANCES;DRIVING

## 2017-01-24 ASSESSMENT — VISUAL ACUITY: OU: BASELINE

## 2017-01-24 NOTE — PLAN OF CARE
[facility] Inpatient Admission Note:    Patient admitted to 3218/3218-1 at approximately 1545 via bed accompanied by sons from emergency room . Report received from Federica in SBAR format at 1520 via telephone. Patient transferred to bed via slide board.. Patient is alert and oriented X 2, denies pain; rates at 0 on 0-10 scale.  Patient oriented to room, unit, hourly rounding, and plan of care. Explained admission packet and patient handbook with patient bill of rights brochure. Will continue to monitor and document as needed.     Inpatient Nursing criteria listed below was met:      Health care directives status obtained and documented: Yes      Care Everywhere authorization obtained No      MRSA swab completed for patient 65 years and older: Yes      Patient identifies a surrogate decision maker: Yes If yes, who: Nadira Contact Information: [phone]      Core Measure diagnosis present:No. If yes, state diagnosis: N/A       If initial lactic acid >2.0, repeat lactic acid drawn within one hour of arrival to unit: NA. If no, state reason: not drawn in ER      Vaccination assessment and education completed: Yes   Vaccinations received prior to admission: Pneumovax yes  Influenza(seasonal)  YES   Vaccination(s) ordered: not given today because UTD      Clergy visit ordered if patient requests: N/A      Skin issues/needs documented: N/A      Isolation Patient: no Education given, correct sign in place and documentation row added to PCS:  No      Fall Prevention Yes: Care plan updated, education given and documented, sticker and magnet in place: Yes      Care Plan initiated: Yes      Education Documented (including assessment): Yes      Patient has discharge needs : Yes If yes, please explain: unknown at this time

## 2017-01-24 NOTE — H&P
"CHIEF COMPLAINT:  Confusion.      SUBJECTIVE:  Vanesa Fleming is a 71-year-old female that was brought into the emergency room by her  and family because of acute confusion.  When the patient awoke today she was talking clearly, nongarbled, but what she was saying was completely inappropriate.  She seemed confused.  She could not figure out how to use her arms and legs as she was not able to follow instructions.  Family did not see any facial droop but they also had concern that she lost the lateral right portion of her visual field.      She was brought into the emergency room and evaluation there revealed possible TIA, although, the CT scan of the head did not show any acute findings.  The patient does have a history of hypertension and hyperlipidemia, but the  is unclear if she has been consistently taking her medications.  According to , she has had at least 5-6 episodes, \"spells\" in the last 5-6 years.  They have been concerned about her memory for some time and I believe she had mentioned it previously to Felipa her nurse practitioner, but the last time she was actually seen for any reason was in .  She denies having diabetes and it is not known if these 5-6 episodes that she had previously, as she was not evaluated for them, if they were actual TIAs or something else.  The patient denies headache.  She denies any blurred or double vision.  She feels that her right arm is weaker but the daughter-in-law comments that she just has what seemed to be a discoordination of her extremities because she was not able to follow commands.      PAST MEDICAL HISTORY:   1.  Hyperlipidemia.   2.  Hypertension.   3.  Hypothyroidism.   4.  History of breast cancer in .   5.  Osteopenia.   6.  Anxiety.   7.  Coronary artery disease.   8.  Gout.   9.  Major depression.   10.  Colonoscopy in .      FAMILY HISTORY:  Father had Alzheimer's dementia.  Mother  at 63 of an MI.  She also had " diabetes and hypertension.  Brother  of prostate cancer with metastases.  Another brother with colon cancer.      SURGEON:  Tobacco use, she quit over 35 years ago.  She is remarried.  She has family in the area that are supportive and she lives with her  at the [place].      REVIEW OF SYSTEMS:  Denies dizziness.  Denies dysphagia.  Family denies that she has had asymmetry to the face.  She denies sore throat and difficulty swallowing.  Denies chest pain, palpitations or shortness of breath.  Denies abdominal pain.  Denies nausea and vomiting.  Denies dysuria.  Denies any unusual fevers.      PHYSICAL EXAMINATION:   VITAL SIGNS:  Temperature is 98.2, pulse 72, blood pressure is 161/82, respiratory rate 16, O2 sats on room air 95%.   GENERAL:  This is an alert female in no obvious distress.   EYES:  Eyes without injection.  Pupils are equal, round and reactive to light and accommodation.   EARS:  Canals are free of cerumen.   MOUTH:  Free of oral lesions.   NECK:  No adenopathy.  Thyroid abnormalities.  No carotid bruits.     CARDIOVASCULAR:  Regular rate and rhythm.   LUNGS:  Clear to auscultation bilaterally.   ABDOMEN:  Thin, soft, nontender, nondistended.   EXTREMITIES:  Without edema.   NEUROLOGIC:  Alert and oriented to person, not place or time.  Cranial nerves II-XII are intact.  Face is symmetric.  Tongue protrudes to midline and moves symmetrically.  Shoulder shrug is intact.  Strength is 5/5 in the upper extremities for deltoid, biceps, triceps and , 5/5 in the lower extremities for quadriceps, hamstring, dorsi and plantarflexion.  Gait was not assessed.  Sensation intact grossly.  Speech is clear.   MENTAL STATUS EXAMINATION:  Reveals a euthymic mood, thought processes and content limited.  The patient has difficulty with short-term recall of 2 days earlier events.      LABORATORY DATA:  Sodium 138, potassium 3.6, BUN is 21, creatinine is 1.2.  LFTs within normal limits.  TSH 2.7,  glucose 101.  White count 8.4, hemoglobin 14.6, platelets 233.  CT scan of the head showed no acute findings.  CT angiogram showed 50% stenosis in the proximal internal carotid on the right and 70% in the proximal carotid on the left.      ASSESSMENT AND PLAN:   1.  This is a 71-year-old female who presents with a homonymous hemianopsia on the right and acute confusion.  The patient will be admitted OPO status.  She has been on low-dose aspirin.  We will increase that to 225 mg a day.  Put her on telemetry to assess for arrhythmias.  She had the carotids looked at.  I am going to add echocardiogram to look at the heart valves.  Also, an MRI in the a.m. to assess the posterior portion of her brain because of her visual defect.  Given these other episodes that she has had, definitely would warrant her seeing Neurology to discuss what the concerns regarding memory with the family that she likely would benefit from neuropsychological testing sometime in the near future.  Will assess her lipids in the morning to verify that her LDL is less than 70.  Will also not order her home blood pressure medications to ensure cerebral perfusion.   2.  Hypothyroidism:  Current TSH is in the therapeutic range.  No changes in her medication.   3.  Hypertension:  As above.   4.  Hyperlipidemia.  Continue statin therapy pending lab results.   5.  Deep venous thrombosis prophylaxis:  Do mechanical prophylaxis at this time.   6.  Code status:  Patient is full code.      Expected length of hospitalization less than or equal to 2 days.         CARL BRANCH MD             D: 2017 20:47   T: 2017 21:41   MT:       Name:     JACKY OLMEDO   MRN:      [id number]        Account:      [id number]   :      1945           Admitted:     [id number]      Document: [id number]

## 2017-01-24 NOTE — PROGRESS NOTES
01/24/17 1500   General Information, SLP   Type of Evaluation Speech and Language   Type of Visit Initial   Start of Care Date 01/24/17   Onset of Illness/Injury or Date of Surgery - Date 01/23/17   Referring Physician Dr. Perera   Patient/Family Goals Statement Patient would like to help her word finding difficulties   Pertinent History of Current Problem Pt had difficulty speaking beginning yesterday; pt was brought in to the hospital; since then, pt has had improved speaking ability however demonstrates word finding during conversation.   Precautions/Limitations no known precautions/limitations   General Observations Patient was pleasant and cooperative throughout session. Pt noted to laugh anxiously throughout eval   General Info Comments Patient lives in apt with , they live independently   Oral Motor Sensory Function   Deficits noted in Labial Function (m-VII, S-V) None   Deficits noted in Lingual Function (m-XII, s-V, VII, IX, XII) None   Deficits noted in Mandibular Function (m-V) None   Deficits noted in Velar Function (m-V, X, XI, s-IX) None   Deficits noted in Laryngeal Function (m-X, s-X) None   Functional Assessment Scale (Oral Motor) No Impairment   Speech   Deficits in [place] None   Apraxia Battery:  Sounds (out of 10 possible) 10   Apraxia Battery:  Word Repetition - single syllable (out of 10 possible) 10   Apraxia Battery:  Increasing word length (out of 10 possible) 10   Apraxia Battery:  Word Repetition - mulitple syllables (out of 10 possible) 9   Apraxia Battery:  Repeat Sentences (out of 5 possible) 5   Language: Auditory Comprehension (understanding of spoken language)   Tests were administered at the following levels Basic (rote activities);Moderate (routine daily activities);Complex (vocation/community/social activities)   Y/N Simple Questions (out of 10 total) 10   Functional Assessment Scale (Auditory Comprehension) Minimal Impairment   Comments (Auditory Comprehension)  "Patient was read 2 paragraphs; pt answered y/n correctly in 11/16   Language: Verbal Expression (use of spoken language to express information)   Tests were administered at the following levels Basic (rote activities);Moderate (routine daily activities);Complex (vocation/community/social activities)   Automatized Sequences; [place] Diagnostic Aphasia Exam (out of 8 total) 8   Phrase/Sentence Completion (out of 10 total) 10   Generate Sentences; [place] Test for Differential Diagnosis Of Aphasia (out of 6 total) 5   [place] Naming Test, short form (out of 15 total) 12   Functional Assessment Scale (Verbal Expression) Mild Impairment   Comments (Verbal Expression) Patient demonstrated difficulty with word finding and syntax; upon conversation, pt used many general terms and \"you know\"   Cognitive Status Examination   Behavioral Observations WFL   Additional cognitive-linguistic evaluation indicated  recommend   Standardized cognitive-linguistic assessment completed to be completed during future session   Cognitive Status Exam Comments COgnitive linguistic testing is recommended on an outpatient basis if pt continues to note difficulties   General Therapy Interventions   Intervention Comments Evaluation Only   Clinical Impression, SLP Eval   Criteria for Skilled Therapeutic Interventions Met Yes;No significant expected improvement in functional status   SLP Diagnosis Expressive Aphasia   Functional limitations due to impairments Difficulty finding words   Rehab Potential Good, to achieve stated therapy goals   Anticipated Discharge Disposition Home with Outpatient Therapy   Risks and Benefits of Treatment have been explained. Yes   Patient, Family & other staff in agreement with plan of care Yes   Clinical Impression Comments Outpatient speech therapy is recommended if pt's word finding does not improve in the next 2 days, due to significant improvement overnight.    Therapy Certification   Certification date from " 01/24/17   Certification date to 01/24/17   Medical Diagnosis Expressive Aphasia   Certification I certify the need for these services furnished under this plan of treatment and while under my care.  (Physician co-signature of this document indicates review and certification of the therapy plan).   Total Evaluation Time      Total Evaluation Time (Minutes) 40

## 2017-01-24 NOTE — PROGRESS NOTES
Call received from [facility] saying [facility] PCP is Dylan Cool NP. Vanesa and Nadira updated on this, and are agreeable. They were also provided with healthcare directive information.

## 2017-01-24 NOTE — PLAN OF CARE
Problem: Goal Outcome Summary  Goal: Goal Outcome Summary  Safe to return home   Outcome: No Change  ADL DIFFICULTIES: independent  EQUIPMENT NEEDS: NA  D/C RECOMMENDATIONS: home with assist for IADLs (driving, med and money management)  TREATMENT D/C RECOMMENDATIONS: no need for further OT at this time  COMMENTS: pt was independent with ADLs but was slow processing with answering safety questions and writing name.

## 2017-01-24 NOTE — PROGRESS NOTES
[facility]  Progress Note            Subjective:   Pt asks more appropriate questions today but pleasant confused. Denies pain. Insomnia last night. Was unaware how to void last night. Needed to straight cath.                 Medications:     Current Facility-Administered Medications Ordered in Epic   Medication Dose Route Frequency Last Rate Last Dose     0.9% sodium chloride infusion   Intravenous Continuous 75 mL/hr at 01/23/17 2145       allopurinol (ZYLOPRIM) tablet 300 mg  300 mg Oral Daily   300 mg at 01/23/17 1905     busPIRone (BUSPAR) tablet 30 mg  30 mg Oral QAM         furosemide (LASIX) tablet 40 mg  40 mg Oral BID   40 mg at 01/23/17 2121     levothyroxine (SYNTHROID/LEVOTHROID) tablet 50 mcg  50 mcg Oral Daily   50 mcg at 01/23/17 1905     sertraline (ZOLOFT) tablet 50 mg  50 mg Oral Daily   50 mg at 01/23/17 1906     simvastatin (ZOCOR) tablet 40 mg  40 mg Oral At Bedtime   40 mg at 01/23/17 2121     acetaminophen (TYLENOL) tablet 650 mg  650 mg Oral Q4H PRN         naloxone (NARCAN) injection 0.1-0.4 mg  0.1-0.4 mg Intravenous Q2 Min PRN         ondansetron (ZOFRAN-ODT) ODT tab 4 mg  4 mg Oral Q6H PRN        Or     ondansetron (ZOFRAN) injection 4 mg  4 mg Intravenous Q6H PRN         No current [facility]-ordered outpatient prescriptions on file.       Review of Systems:   C: NEGATIVE for fever, chills, change in weight  E/M: NEGATIVE for ear, mouth and throat problems  R: NEGATIVE for significant cough or SOB  CV: NEGATIVE for chest pain, palpitations or peripheral edema               Physical Exam:   Vitals were reviewed  Patient Vitals for the past 24 hrs:   BP Temp Temp src Pulse Heart Rate Resp SpO2 Height Weight   01/24/17 0245 147/76 mmHg 97.5  F (36.4  C) Tympanic - 58 14 97 % - -   01/23/17 2123 129/58 mmHg 98.1  F (36.7  C) Tympanic - 66 16 97 % - -   01/23/17 1620 - - - - - - - 5' (1.524 m) 149 lb 11.1 oz (67.9 kg)   01/23/17 1603 164/80 mmHg 100.1  F (37.8  C) Tympanic - 72 16 95  % - -   01/23/17 1526 161/82 mmHg 98.2  F (36.8  C) Oral 72 - 16 95 % - -   01/23/17 1515 170/92 mmHg - - - 68 - - - -   01/23/17 1504 - - - - - - 95 % - -   01/23/17 1502 161/82 mmHg - - - 72 - - - -   01/23/17 1500 170/95 mmHg - - - 74 - - - -   01/23/17 1445 170/88 mmHg - - - 68 - - - -   01/23/17 1430 167/93 mmHg - - - 68 (!) 10 - - -   01/23/17 1415 159/94 mmHg - - - 72 - - - -   01/23/17 1345 151/82 mmHg - - - 71 17 - - -   01/23/17 1330 156/79 mmHg - - - 73 - - - -   01/23/17 1245 (!) 162/113 mmHg - - - 76 (!) 11 98 % - -   01/23/17 1230 153/90 mmHg - - - 73 (!) 10 96 % - -   01/23/17 1130 - - - - - 18 96 % - -   01/23/17 1100 167/95 mmHg - - - 81 - 97 % - -   01/23/17 1050 (!) 189/95 mmHg 100  F (37.8  C) Oral 84 - 18 98 % - -     Constitutional: Awake, alert, cooperative.  Eyes:  sclera clear  Lungs: No increased work of breathing, good air exchange, clear to auscultation bilaterally, no crackles or wheezing.  Cardiovascular: Regular rate and rhythm, normal S1 and S2, no S3 or S4, and no murmur noted.  Abdomen: normal bowel sounds, soft, non-distended, non-tender, no masses palpated,   Neurologic: Awake, alert, oriented to name.       Peripheral IV 01/23/17 Left;Right (Active)   Site Assessment WDL 1/24/2017  2:55 AM   Line Status Saline locked 1/24/2017  2:55 AM   Phlebitis Scale 0-->no symptoms 1/24/2017  2:55 AM   Infiltration Scale 0 1/24/2017  2:55 AM   Extravasation? No 1/24/2017  2:55 AM   Number of days:1       Peripheral IV 01/23/17 Left (Active)   Site Assessment WDL 1/24/2017  2:55 AM   Line Status Infusing 1/24/2017  2:55 AM   Phlebitis Scale 0-->no symptoms 1/24/2017  2:55 AM   Infiltration Scale 0 1/24/2017  2:55 AM   Extravasation? No 1/24/2017  2:55 AM   Number of days:1     Line/device assessment(s) completed for medical necessity         Data:     Results for orders placed or performed during the hospital encounter of 01/23/17 (from the past 24 hour(s))   CBC with platelets differential    Result Value Ref Range    WBC 8.4 4.0 - 11.0 10e9/L    RBC Count 4.80 3.8 - 5.2 10e12/L    Hemoglobin 14.7 11.7 - 15.7 g/dL    Hematocrit 42.4 35.0 - 47.0 %    MCV 88 78 - 100 fl    MCH 30.6 26.5 - 33.0 pg    MCHC 34.7 31.5 - 36.5 g/dL    RDW 13.3 10.0 - 15.0 %    Platelet Count 233 150 - 450 10e9/L    Diff Method Automated Method     % Neutrophils 70.1 %    % Lymphocytes 19.5 %    % Monocytes 9.8 %    % Eosinophils 0.0 %    % Basophils 0.4 %    % Immature Granulocytes 0.2 %    Nucleated RBCs 0 0 /100    Absolute Neutrophil 5.9 1.6 - 8.3 10e9/L    Absolute Lymphocytes 1.6 0.8 - 5.3 10e9/L    Absolute Monocytes 0.8 0.0 - 1.3 10e9/L    Absolute Eosinophils 0.0 0.0 - 0.7 10e9/L    Absolute Basophils 0.0 0.0 - 0.2 10e9/L    Abs Immature Granulocytes 0.0 0 - 0.4 10e9/L    Absolute Nucleated RBC 0.0    Comprehensive metabolic panel   Result Value Ref Range    Sodium 138 133 - 144 mmol/L    Potassium 3.6 3.4 - 5.3 mmol/L    Chloride 102 94 - 109 mmol/L    Carbon Dioxide 26 20 - 32 mmol/L    Anion Gap 10 3 - 14 mmol/L    Glucose 101 (H) 70 - 99 mg/dL    Urea Nitrogen 21 7 - 30 mg/dL    Creatinine 1.20 (H) 0.52 - 1.04 mg/dL    GFR Estimate 44 (L) >60 mL/min/1.7m2    GFR Estimate If Black 53 (L) >60 mL/min/1.7m2    Calcium 9.3 8.5 - 10.1 mg/dL    Bilirubin Total 1.0 0.2 - 1.3 mg/dL    Albumin 3.9 3.4 - 5.0 g/dL    Protein Total 8.0 6.8 - 8.8 g/dL    Alkaline Phosphatase 88 40 - 150 U/L    ALT 14 0 - 50 U/L    AST 14 0 - 45 U/L   CRP inflammation   Result Value Ref Range    CRP Inflammation 4.9 0.0 - 8.0 mg/L   TSH with free T4 reflex   Result Value Ref Range    TSH 2.70 0.40 - 4.00 mU/L   UA reflex to Microscopic and Culture   Result Value Ref Range    Color Urine Yellow     Appearance Urine Clear     Glucose Urine Negative NEG mg/dL    Bilirubin Urine Negative NEG    Ketones Urine 5 (A) NEG mg/dL    Specific Gravity Urine 1.018 1.003 - 1.035    Blood Urine Trace (A) NEG    pH Urine 5.5 4.7 - 8.0 pH    Protein Albumin Urine  30 (A) NEG mg/dL    Urobilinogen mg/dL Normal 0.0 - 2.0 mg/dL    Nitrite Urine Negative NEG    Leukocyte Esterase Urine Negative NEG    Source Catheterized Urine     RBC Urine 2 0 - 2 /HPF    WBC Urine 2 0 - 2 /HPF    Bacteria Urine Few (A) NEG /HPF    Squamous Epithelial /HPF Urine 3 (H) 0 - 1 /HPF    Mucous Urine Present (A) NEG /LPF   CT Neck Angio w/o & w Contrast    Narrative    CT ANGIOGRAM OF THE NECK    FINDINGS:  The brachiocephalic artery is widely patent.  The right  vertebral artery appears normal.  The right common carotid artery is  tortuous.  There is heavy calcific plaquing seen in the proximal  internal carotid artery with a minimal luminal diameter of 2.3 mm.  The internal carotid artery beyond the bifurcation measured 5 mm.  This would indicate 50% stenosis in the proximal internal carotid  artery.  The internal carotid artery is widely patent to the skull  base.    The left common carotid artery is tortuous at the left carotid  bifurcation.  In the internal carotid artery minimal luminal diameter  measured 1.9 mm.  Beyond the bifurcation the internal carotid artery  measures 6 mm.  This would be consistent with approximately 70%  stenosis in the proximal internal carotid artery.  The left subclavian  artery is widely patent.  The left vertebral artery is tortuous  proximally and has some mild calcific plaquing with less than 50%  stenosis.    The apices of the lungs are clear.  The thyroid and larynx appear  normal.  Cervical lymph nodes appear normal.  Parotid and  submandibular glands appear normal.  Paranasal sinuses are clear.    CT ANGIOGRAM OF THE HEAD    FINDINGS:  CT angiogram of the brain reveals the distal vertebral,  basilar, superior cerebellar and posterior cerebral branches to be  normal.  Both carotid siphons demonstrate mild plaquing.  The  horizontal portions of the anterior and middle cerebral artery appear  normal.  No intracranial stenoses, aneurysms or vascular shifts  are  noted.    The ventricular shift is normal in size.  There are no masses,  ventricular shifts or extracerebral collections.    IMPRESSION:  NEGATIVE CT ANGIOGRAM OF THE BRAIN.  THERE IS A 50%  STENOSIS IN THE PROXIMAL INTERNAL CAROTID ARTERY ON THE RIGHT AND 70%  STENOSIS IN THE PROXIMAL INTERNAL CAROTID ARTERY ON THE LEFT.        SIGNATURE PAGE ONLY  Exam Date: Jan 23, 2017 01:15:00 PM  Author: NSA POWERS  This report is preliminary and transcribed     CTA Angiogram Head    Narrative    CT ANGIOGRAM OF THE NECK    FINDINGS:  The brachiocephalic artery is widely patent.  The right  vertebral artery appears normal.  The right common carotid artery is  tortuous.  There is heavy calcific plaquing seen in the proximal  internal carotid artery with a minimal luminal diameter of 2.3 mm.  The internal carotid artery beyond the bifurcation measured 5 mm.  This would indicate 50% stenosis in the proximal internal carotid  artery.  The internal carotid artery is widely patent to the skull  base.    The left common carotid artery is tortuous at the left carotid  bifurcation.  In the internal carotid artery minimal luminal diameter  measured 1.9 mm.  Beyond the bifurcation the internal carotid artery  measures 6 mm.  This would be consistent with approximately 70%  stenosis in the proximal internal carotid artery.  The left subclavian  artery is widely patent.  The left vertebral artery is tortuous  proximally and has some mild calcific plaquing with less than 50%  stenosis.    The apices of the lungs are clear.  The thyroid and larynx appear  normal.  Cervical lymph nodes appear normal.  Parotid and  submandibular glands appear normal.  Paranasal sinuses are clear.    CT ANGIOGRAM OF THE HEAD    FINDINGS:  CT angiogram of the brain reveals the distal vertebral,  basilar, superior cerebellar and posterior cerebral branches to be  normal.  Both carotid siphons demonstrate mild plaquing.  The  horizontal portions of the  anterior and middle cerebral artery appear  normal.  No intracranial stenoses, aneurysms or vascular shifts are  noted.    The ventricular shift is normal in size.  There are no masses,  ventricular shifts or extracerebral collections.    IMPRESSION:  NEGATIVE CT ANGIOGRAM OF THE BRAIN.  THERE IS A 50%  STENOSIS IN THE PROXIMAL INTERNAL CAROTID ARTERY ON THE RIGHT AND 70%  STENOSIS IN THE PROXIMAL INTERNAL CAROTID ARTERY ON THE LEFT.        SIGNATURE PAGE ONLY  Exam Date: Jan 23, 2017 01:16:22 PM  Author: NAS POWERS  This report is preliminary and transcribed     Lipid Profile   Result Value Ref Range    Cholesterol 268 (H) <200 mg/dL    Triglycerides 136 <150 mg/dL    HDL Cholesterol 49 (L) >49 mg/dL    LDL Cholesterol Calculated 192 (H) <100 mg/dL    Non HDL Cholesterol 219 (H) <130 mg/dL     All cardiac studies reviewed by me.  All imaging studies reviewed by me.         Assessment and Plan:   Active Problems:    TIA (transient ischemic attack)  Ct angio neg for signif stenosis. Having echo currently and mri shortly. Chol much too high. Will change to lipitor, higher dose.  Will hold off on starting her bp meds for now. Monitor.  PT, OT to see. I believe the patient given her cognitive status that may benefit from short term rehab but cognitively her baseline before this vascular event is likely decreased.    htn-as above    Depression-cont zoloft

## 2017-01-24 NOTE — PLAN OF CARE
Problem: Goal Outcome Summary  Goal: Goal Outcome Summary  Safe to return home   Outcome: Improving  VSS. Alert but disoriented to time. Needing redirection by staff and family members. Eyes PERRLA, able to follow finger back and forth but c/o blurred vision on R side. Motor strength intact and equal except some weakness noted in BLE straight leg raise against resistance. Unable to state when last taken medications were, son brought home medications to review. Bed alarms on when family not at bedside. Poor appetite.   Staff awakened pt to try and use the bedside commode. Pt had been incontinent of loose stool in her sleep and was unaware. Staff assisted in cleaning up but needed to give directions several times. Unable to void when up to the commode even though IV fluids have been running and received lasix this evening. Bladder scan showed 892 ml residual. MD notified and obtained a telephone order for straight cath- 900 ml straw colored yellow output. Pt appears to be searching for words when speaking.       Face to face report given with opportunity to observe patient.    Report given to CARMINE Villela   1/24/2017  7:35 AM

## 2017-01-24 NOTE — PLAN OF CARE
Problem: Goal Outcome Summary  Goal: Goal Outcome Summary  Safe to return home   Outcome: Improving  Pleasant this morning, speech logical, laughing frequently during conversation maybe during times when having difficulty finding words.  Has been following commands without problems this shift, using call light to make needs known.  Has been continent of both bowel and bladder.  Denies pain, nausea.  Has been have loose stools.  Night shift reported having to straight cath patient which resulted in 900 ml.  Patient reported urge to void at approx. 0900- bladder scanned prior to void at 200- void approx. 150 urine. ( stool mixed with urine missing hat).  IV saline locked.  Went to MRI this morning, awaiting Echo. PT in to evaluate patient today, awaiting speech evaluation.      Face to face report given with opportunity to observe patient.    Report given to Noreen Amor   1/24/2017  3:39 PM

## 2017-01-24 NOTE — PLAN OF CARE
Problem: Goal Outcome Summary  Goal: Goal Outcome Summary  Safe to return home   Outcome: Adequate for Discharge Date Met:  01/24/17  Physical Therapy Discharge Summary    Reason for therapy discharge:    Discharged to home with outpatient therapy.    Progress towards therapy goal(s). See goals on Care Plan in [facility] electronic health record for goal details.  Goals met    Therapy recommendation(s):    Continued therapy is recommended.  Rationale/Recommendations:  could benefit from outpatient PT for balance.

## 2017-01-24 NOTE — PLAN OF CARE
Problem: Patient Goal: Social Work Focus  Goal: 1. Patient Goal: Social Work Focus  Outcome: No Change  Assessment completed via flowsheet.  Vanesa is up in her chair eating pancakes and visiting with her  Joe, son Gilmer and dtr in law Nadira. She does not have a PCP, Joe says she has not seen a provider in about 2 years. She and family request Dr Ryan be contacted for permission to establish care, call was placed to the clinic for this. Nadira is working on establishing an eye provider, and Vanesa says she does not want a dentist because she has dentures. She was reminded of the importance of continued routine oral exams even with dentures. She does not have advance directives in place and would like this information. She uses [facility]. She is not a . There are currently no case workers or home care type services in place. There is a staff person at the [facility] who can assist them with finding appropriate services should they need them.     She and her  live in an apartment at the [facility]. Prior to this hospitalization she was independent with her personal cares and household upkeep. She drives and was previously able to do her own shopping. She walks independently at home with no device. They deny any recent falls, tho she does sometimes stumble. She has been able to manage her med and appointment schedules. Nadira will begin setting up her meds for her once discharged.     She denies any sleep concerns, her  says she sleeps lightly and is up frequently and has trouble getting back to sleep. She is taking an anti-depressant and they all feel it is working well for her. She feels comfortable discussing this with a provider as an outpatient. She quite smoking many years ago. She does not use alcohol nor street drugs.

## 2017-01-24 NOTE — PROGRESS NOTES
01/24/17 1103   Quick Adds   Type of Visit Initial Occupational Therapy Evaluation   Living Environment   Lives With spouse   Living Arrangements apartment   Home Accessibility no concerns   Transportation Available car   Living Environment Comment Pt lives on [address]th floor of [place]   Self-Care   Dominant Hand right   Usual Activity Tolerance moderate   Current Activity Tolerance moderate   Regular Exercise no   Equipment Currently Used at Home none   Functional Level Prior   Ambulation 1-->assistive equipment   Transferring 0-->independent   Toileting 0-->independent   Bathing 0-->independent   Dressing 0-->independent   Eating 0-->independent   Communication 0-->understands/communicates without difficulty   Swallowing 0-->swallows foods/liquids without difficulty   Cognition 0 - no cognition issues reported   Fall history within last six months no   Which of the above functional risks had a recent onset or change? none       Present no   General Information   Onset of Illness/Injury or Date of Surgery - Date 01/23/17   Referring Physician Ana Ryan MD   Patient/Family Goals Statement pt would like to get back home   Additional Occupational Profile Info/Pertinent History of Current Problem pt was at home and had confusion and difficulty coordinating arms and legs   Precautions/Limitations fall precautions   Weight-Bearing Status - LUE full weight-bearing   Weight-Bearing Status - RUE full weight-bearing   Weight-Bearing Status - LLE full weight-bearing   Weight-Bearing Status - RLE full weight-bearing   Cognitive Status Examination   Orientation orientation to person, place and time   Level of Consciousness alert   Able to Follow Commands WNL/WFL   Personal Safety (Cognitive) mild impairment   Memory intact   Attention No deficits were identified   Organization/Problem Solving No deficits were identified   Cognitive Comment Pt struggled with identifing 911, and writing complete name  "  Visual Perception   Visual Perception Wears glasses   Visual Perception Comments glasses for reading   Sensory Examination   Sensory Quick Adds No deficits were identified   Pain Assessment   Patient Currently in Pain No   Range of Motion (ROM)   ROM Quick Adds No deficits were identified   Strength   Manual Muscle Testing Quick Adds No deficits were identified   Muscle Tone Assessment   Muscle Tone Quick Adds No deficits were identified   Coordination   Upper Extremity Coordination No deficits were identified   Transfer Skill: Sit to Stand   Level of [place]: Sit/Stand independent   Transfer Skill: Toilet Transfer   Level of [place]: Toilet independent   Upper Body Dressing   Level of [place]: Dress Upper Body independent   Lower Body Dressing   Level of [place]: Dress Lower Body independent   Grooming   Level of [place]: Grooming independent   Instrumental Activities of Daily Living (IADL)   Previous Responsibilities meal prep;housekeeping;laundry;shopping;medication management;finances;driving   IADL Comments daughter-in law present and reports she would be able to assist if needed   Clinical Impression   Criteria for Skilled Therapeutic Interventions Met evaluation only   Assessment of Occupational Performance 1-3 Performance Deficits   Identified Performance Deficits cognition   Clinical Decision Making (Complexity) Low complexity   Anticipated Discharge Disposition Home with Assist   Risks and Benefits of Treatment have been explained. Yes   Patient, Family & other staff in agreement with plan of care Yes   Clinical Impression Comments Pt may benefit from assistance with IADL tasks such as med and money management and driving. Pt was slow to process safety questions and write name but was able to manage after short time. Pt was independent with ADLs   [facility] AM-PAC TM \"6 Clicks\"   2016, Trustees of [facility], under license to [employer]  All rights reserved.   6 " "Clicks Short Forms Daily Activity Inpatient Short Form   [facility] AM-PAC  \"6 Clicks\" Daily Activity Inpatient Short Form   1. Putting on and taking off regular lower body clothing? 4 - None   2. Bathing (including washing, rinsing, drying)? 4 - None   3. Toileting, which includes using toilet, bedpan or urinal? 4 - None   4. Putting on and taking off regular upper body clothing? 4 - None   5. Taking care of personal grooming such as brushing teeth? 4 - None   6. Eating meals? 4 - None   Daily Activity Raw Score (Score out of 24.Lower scores equate to lower levels of function) 24   Total Evaluation Time   Total Evaluation Time (Minutes) 25      01/24/17 1103   Quick Adds   Type of Visit Initial Occupational Therapy Evaluation   Living Environment   Lives With spouse   Living Arrangements apartment   Home Accessibility no concerns   Transportation Available car   Living Environment Comment Pt lives on [address]th floor of [place]   Self-Care   Dominant Hand right   Usual Activity Tolerance moderate   Current Activity Tolerance moderate   Regular Exercise no   Equipment Currently Used at Home none   Functional Level Prior   Ambulation 1-->assistive equipment   Transferring 0-->independent   Toileting 0-->independent   Bathing 0-->independent   Dressing 0-->independent   Eating 0-->independent   Communication 0-->understands/communicates without difficulty   Swallowing 0-->swallows foods/liquids without difficulty   Cognition 0 - no cognition issues reported   Fall history within last six months no   Which of the above functional risks had a recent onset or change? none       Present no   General Information   Onset of Illness/Injury or Date of Surgery - Date 01/23/17   Referring Physician Ana Ryan MD   Patient/Family Goals Statement pt would like to get back home   Additional Occupational Profile Info/Pertinent History of Current Problem pt was at home and had confusion and difficulty " coordinating arms and legs   Precautions/Limitations fall precautions   Weight-Bearing Status - LUE full weight-bearing   Weight-Bearing Status - RUE full weight-bearing   Weight-Bearing Status - LLE full weight-bearing   Weight-Bearing Status - RLE full weight-bearing   Cognitive Status Examination   Orientation orientation to person, place and time   Level of Consciousness alert   Able to Follow Commands WNL/WFL   Personal Safety (Cognitive) mild impairment   Memory intact   Attention No deficits were identified   Organization/Problem Solving No deficits were identified   Cognitive Comment Pt struggled with identifing 911, and writing complete name   Visual Perception   Visual Perception Wears glasses   Visual Perception Comments glasses for reading   Sensory Examination   Sensory Quick Adds No deficits were identified   Pain Assessment   Patient Currently in Pain No   Range of Motion (ROM)   ROM Quick Adds No deficits were identified   Strength   Manual Muscle Testing Quick Adds No deficits were identified   Muscle Tone Assessment   Muscle Tone Quick Adds No deficits were identified   Coordination   Upper Extremity Coordination No deficits were identified   Transfer Skill: Sit to Stand   Level of [place]: Sit/Stand independent   Transfer Skill: Toilet Transfer   Level of [place]: Toilet independent   Upper Body Dressing   Level of [place]: Dress Upper Body independent   Lower Body Dressing   Level of [place]: Dress Lower Body independent   Grooming   Level of [place]: Grooming independent   Instrumental Activities of Daily Living (IADL)   Previous Responsibilities meal prep;housekeeping;laundry;shopping;medication management;finances;driving   IADL Comments daughter-in law present and reports she would be able to assist if needed   Clinical Impression   Criteria for Skilled Therapeutic Interventions Met evaluation only   Assessment of Occupational Performance 1-3 Performance Deficits  "  Identified Performance Deficits cognition   Clinical Decision Making (Complexity) Low complexity   Anticipated Discharge Disposition Home with Assist   Risks and Benefits of Treatment have been explained. Yes   Patient, Family & other staff in agreement with plan of care Yes   Clinical Impression Comments Pt may benefit from assistance with IADL tasks such as med and money management and driving. Pt was slow to process safety questions and write name but was able to manage after short time. Pt was independent with ADLs   [facility] AM-PAC TM \"6 Clicks\"   2016, Trustees of [facility], under license to [employer].  All rights reserved.   6 Clicks Short Forms Daily Activity Inpatient Short Form   [facility] AM-PAC  \"6 Clicks\" Daily Activity Inpatient Short Form   1. Putting on and taking off regular lower body clothing? 4 - None   2. Bathing (including washing, rinsing, drying)? 4 - None   3. Toileting, which includes using toilet, bedpan or urinal? 4 - None   4. Putting on and taking off regular upper body clothing? 4 - None   5. Taking care of personal grooming such as brushing teeth? 4 - None   6. Eating meals? 4 - None   Daily Activity Raw Score (Score out of 24.Lower scores equate to lower levels of function) 24   Total Evaluation Time   Total Evaluation Time (Minutes) 25      01/24/17 1103   Quick Adds   Type of Visit Initial Occupational Therapy Evaluation   Living Environment   Lives With spouse   Living Arrangements apartment   Home Accessibility no concerns   Transportation Available car   Living Environment Comment Pt lives on [address]th floor of [place]   Self-Care   Dominant Hand right   Usual Activity Tolerance moderate   Current Activity Tolerance moderate   Regular Exercise no   Equipment Currently Used at Home none   Functional Level Prior   Ambulation 1-->assistive equipment   Transferring 0-->independent   Toileting 0-->independent   Bathing 0-->independent   Dressing " 0-->independent   Eating 0-->independent   Communication 0-->understands/communicates without difficulty   Swallowing 0-->swallows foods/liquids without difficulty   Cognition 0 - no cognition issues reported   Fall history within last six months no   Which of the above functional risks had a recent onset or change? none       Present no   General Information   Onset of Illness/Injury or Date of Surgery - Date 01/23/17   Referring Physician Ana Ryan MD   Patient/Family Goals Statement pt would like to get back home   Additional Occupational Profile Info/Pertinent History of Current Problem pt was at home and had confusion and difficulty coordinating arms and legs   Precautions/Limitations fall precautions   Weight-Bearing Status - LUE full weight-bearing   Weight-Bearing Status - RUE full weight-bearing   Weight-Bearing Status - LLE full weight-bearing   Weight-Bearing Status - RLE full weight-bearing   Cognitive Status Examination   Orientation orientation to person, place and time   Level of Consciousness alert   Able to Follow Commands WNL/WFL   Personal Safety (Cognitive) mild impairment   Memory intact   Attention No deficits were identified   Organization/Problem Solving No deficits were identified   Cognitive Comment Pt struggled with identifing 911, and writing complete name   Visual Perception   Visual Perception Wears glasses   Visual Perception Comments glasses for reading   Sensory Examination   Sensory Quick Adds No deficits were identified   Pain Assessment   Patient Currently in Pain No   Range of Motion (ROM)   ROM Quick Adds No deficits were identified   Strength   Manual Muscle Testing Quick Adds No deficits were identified   Muscle Tone Assessment   Muscle Tone Quick Adds No deficits were identified   Coordination   Upper Extremity Coordination No deficits were identified   Transfer Skill: Sit to Stand   Level of [place]: Sit/Stand independent   Transfer Skill:  "Toilet Transfer   Level of [place]: Toilet independent   Upper Body Dressing   Level of [place]: Dress Upper Body independent   Lower Body Dressing   Level of [place]: Dress Lower Body independent   Grooming   Level of [place]: Grooming independent   Instrumental Activities of Daily Living (IADL)   Previous Responsibilities meal prep;housekeeping;laundry;shopping;medication management;finances;driving   IADL Comments daughter-in law present and reports she would be able to assist if needed   Clinical Impression   Criteria for Skilled Therapeutic Interventions Met evaluation only   Assessment of Occupational Performance 1-3 Performance Deficits   Identified Performance Deficits cognition   Clinical Decision Making (Complexity) Low complexity   Anticipated Discharge Disposition Home with Assist   Risks and Benefits of Treatment have been explained. Yes   Patient, Family & other staff in agreement with plan of care Yes   Clinical Impression Comments Pt may benefit from assistance with IADL tasks such as med and money management and driving. Pt was slow to process safety questions and write name but was able to manage after short time. Pt was independent with ADLs   [facility] [employer]-PAC TM \"6 Clicks\"   2016, Trustees of [facility], under license to [employer].  All rights reserved.   6 Clicks Short Forms Daily Activity Inpatient Short Form   [facility] AM-PAC  \"6 Clicks\" Daily Activity Inpatient Short Form   1. Putting on and taking off regular lower body clothing? 4 - None   2. Bathing (including washing, rinsing, drying)? 4 - None   3. Toileting, which includes using toilet, bedpan or urinal? 4 - None   4. Putting on and taking off regular upper body clothing? 4 - None   5. Taking care of personal grooming such as brushing teeth? 4 - None   6. Eating meals? 4 - None   Daily Activity Raw Score (Score out of 24.Lower scores equate to lower levels of function) 24   Total Evaluation " Time   Total Evaluation Time (Minutes) 25

## 2017-01-24 NOTE — PROGRESS NOTES
01/24/17 1200   Quick Adds   Type of Visit Initial PT Evaluation   Living Environment   Lives With spouse   Living Arrangements apartment   Home Accessibility no concerns   Transportation Available car   Living Environment Comment Pt lives at [place] [address]   Self-Care   Usual Activity Tolerance moderate   Current Activity Tolerance moderate   Regular Exercise no   Equipment Currently Used at Home none   Activity/Exercise/Self-Care Comment Pt reports she does occasionally use her 's 4WW to take dogs out for convenience   Functional Level Prior   Ambulation 0-->independent   Transferring 0-->independent   Toileting 0-->independent   Bathing 0-->independent   Dressing 0-->independent   Eating 0-->independent   Communication 0-->understands/communicates without difficulty   Swallowing 0-->swallows foods/liquids without difficulty   Cognition 0 - no cognition issues reported   Fall history within last six months no   Which of the above functional risks had a recent onset or change? communication/speech   Prior Functional Level Comment independent   General Information   Onset of Illness/Injury or Date of Surgery - Date 01/23/17   Referring Physician Dr. Celia Quintero   Patient/Family Goals Statement to go home   Pertinent History of Current Problem (include personal factors and/or comorbidities that impact the POC) Pt admitted with change in cognition and difficulty finding words with r/o TIA.  Per chart review and pt, pt has had 5 similar episodes like this in past that have completely resolved on their own.  Daughter present and states she is much improved from admit however continuing to have some word finding difficulties   Weight-Bearing Status - LLE full weight-bearing   Weight-Bearing Status - RLE full weight-bearing   General Observations Pt up in chair visiting with daughter   Cognitive Status Examination   Orientation person;place   Level of Consciousness alert   Follows Commands and Answers  "Questions 100% of the time   Personal Safety and Judgment intact   Memory impaired   Pain Assessment   Patient Currently in Pain No   Integumentary/Edema   Integumentary/Edema no deficits were identifed   Posture    Posture Forward head position   Range of Motion (ROM)   ROM Comment Bilateral LE AROM WFL   Strength   Strength Comments Bilateral hip flex 4-/5, bilateral knee ext 4/5, bilateral ankle DF 4/5.  No notable difference L<>R in LEs   Bed Mobility   Bed Mobility Comments mod I sup<>sit   Transfer Skills   Transfer Comments sit<>stand mod I   Gait   Gait Comments Ambulated within room no AD SBA with no LOB   Balance   Balance Comments fair+ without support   Sensory Examination   Sensory Perception no deficits were identified   Coordination   Coordination no deficits were identified   General Therapy Interventions   Planned Therapy Interventions gait training;balance training   Clinical Impression   Criteria for Skilled Therapeutic Intervention yes, treatment indicated   PT Diagnosis decreased balance with gait   Influenced by the following impairments decreased strength   Functional limitations due to impairments decreased safety with gait    Clinical Presentation Stable/Uncomplicated   Clinical Presentation Rationale Pt's symptoms appear to be resolving   Clinical Decision Making (Complexity) Low complexity   Therapy Frequency` 1 time/week   Predicted Duration of Therapy Intervention (days/wks) eval + 1 treatment   Anticipated Discharge Disposition Home with Outpatient Therapy;Home   Risk & Benefits of therapy have been explained Yes   Patient, Family & other staff in agreement with plan of care Yes   Clinical Impression Comments Pt presented with confusion and some word finding difficulties.  Pt did well with functional mobiltiy with some mild instability noted.   [facility] AM-PAC TM \"6 Clicks\"   2016, Trustees of [facility], under license to [employer].  All rights reserved.   6 Clicks " "Short Forms Basic Mobility Inpatient Short Form   [facility] AM-PAC  \"6 Clicks\" V.2 Basic Mobility Inpatient Short Form   1. Turning from your back to your side while in a flat bed without using bedrails? 4 - None   2. Moving from lying on your back to sitting on the side of a flat bed without using bedrails? 4 - None   3. Moving to and from a bed to a chair (including a wheelchair)? 4 - None   4. Standing up from a chair using your arms (e.g., wheelchair, or bedside chair)? 4 - None   5. To walk in hospital room? 3 - A Little   6. Climbing 3-5 steps with a railing? 3 - A Little   Basic Mobility Raw Score (Score out of 24.Lower scores equate to lower levels of function) 22   Total Evaluation Time   Total Evaluation Time (Minutes) 18     "

## 2017-01-24 NOTE — PLAN OF CARE
Problem: Goal Outcome Summary  Goal: Goal Outcome Summary  Safe to return home   COMMUNICATION: Patient has word finding difficulties: mild expressive aphasia and difficulty recalling complex auditory information  DIET: Regular  STRATEGIES FOR SAFE SWALLOW: no concerns noted  COGNITION: WFL  COMMENTS: Recommend outpatient speech services if word finding does not improve in the next 2 days.

## 2017-01-25 VITALS
TEMPERATURE: 96.7 F | DIASTOLIC BLOOD PRESSURE: 82 MMHG | SYSTOLIC BLOOD PRESSURE: 153 MMHG | HEIGHT: 60 IN | WEIGHT: 149.69 LBS | RESPIRATION RATE: 16 BRPM | BODY MASS INDEX: 29.39 KG/M2 | HEART RATE: 72 BPM | OXYGEN SATURATION: 95 %

## 2017-01-25 LAB
BACTERIA SPEC CULT: NORMAL
MICRO REPORT STATUS: NORMAL
SPECIMEN SOURCE: NORMAL

## 2017-01-25 PROCEDURE — A9270 NON-COVERED ITEM OR SERVICE: HCPCS | Mod: GY | Performed by: FAMILY MEDICINE

## 2017-01-25 PROCEDURE — 25000132 ZZH RX MED GY IP 250 OP 250 PS 637: Mod: GY | Performed by: FAMILY MEDICINE

## 2017-01-25 RX ORDER — ATORVASTATIN CALCIUM 80 MG/1
80 TABLET, FILM COATED ORAL DAILY
Qty: 90 TABLET | Refills: 3 | Status: SHIPPED | OUTPATIENT
Start: 2017-01-25

## 2017-01-25 RX ORDER — ASPIRIN 325 MG
325 TABLET ORAL DAILY
COMMUNITY
Start: 2017-01-25 | End: 2019-03-12

## 2017-01-25 RX ADMIN — LEVOTHYROXINE SODIUM 50 MCG: 50 TABLET ORAL at 09:46

## 2017-01-25 RX ADMIN — FUROSEMIDE 40 MG: 40 TABLET ORAL at 08:34

## 2017-01-25 RX ADMIN — SERTRALINE HYDROCHLORIDE 50 MG: 50 TABLET ORAL at 08:34

## 2017-01-25 RX ADMIN — BUSPIRONE HYDROCHLORIDE 30 MG: 15 TABLET ORAL at 08:33

## 2017-01-25 RX ADMIN — ALLOPURINOL 300 MG: 300 TABLET ORAL at 08:34

## 2017-01-25 ASSESSMENT — VISUAL ACUITY: OU: BASELINE

## 2017-01-25 NOTE — PROGRESS NOTES
[facility]  Progress Note            Subjective:   Expressive speech has signif improvement since yesterday. Patient offers no complaints. Wants to   Go home. Reviewed therapy notes        Medications:     Current Facility-Administered Medications Ordered in Epic   Medication Dose Route Frequency Last Rate Last Dose     atorvastatin (LIPITOR) tablet 80 mg  80 mg Oral Daily at 8 pm   80 mg at 01/24/17 2036     allopurinol (ZYLOPRIM) tablet 300 mg  300 mg Oral Daily   300 mg at 01/24/17 0906     busPIRone (BUSPAR) tablet 30 mg  30 mg Oral QAM   30 mg at 01/24/17 0907     furosemide (LASIX) tablet 40 mg  40 mg Oral BID   40 mg at 01/24/17 2036     levothyroxine (SYNTHROID/LEVOTHROID) tablet 50 mcg  50 mcg Oral Daily   50 mcg at 01/24/17 1129     sertraline (ZOLOFT) tablet 50 mg  50 mg Oral Daily   50 mg at 01/24/17 0906     acetaminophen (TYLENOL) tablet 650 mg  650 mg Oral Q4H PRN         naloxone (NARCAN) injection 0.1-0.4 mg  0.1-0.4 mg Intravenous Q2 Min PRN         ondansetron (ZOFRAN-ODT) ODT tab 4 mg  4 mg Oral Q6H PRN        Or     ondansetron (ZOFRAN) injection 4 mg  4 mg Intravenous Q6H PRN         No current [facility]-ordered outpatient prescriptions on file.       Review of Systems:   C: NEGATIVE for fever, chills, change in weight  E/M: NEGATIVE for ear, mouth and throat problems  R: NEGATIVE for significant cough or SOB  CV: NEGATIVE for chest pain, palpitations or peripheral edema               Physical Exam:   Vitals were reviewed  Patient Vitals for the past 24 hrs:   BP Temp Temp src Heart Rate Resp SpO2   01/25/17 0457 107/59 mmHg 96.6  F (35.9  C) Tympanic 69 16 -   01/25/17 0050 115/63 mmHg 96.2  F (35.7  C) Tympanic 64 16 -   01/24/17 2033 155/82 mmHg 97.9  F (36.6  C) Tympanic 69 16 94 %   01/24/17 1806 - 98  F (36.7  C) Tympanic 67 18 -   01/24/17 0900 155/82 mmHg 96.7  F (35.9  C) Tympanic 65 16 98 %     Constitutional: Awake, alert, cooperative.  Eyes:  sclera clear  Lungs: No  increased work of breathing, good air exchange, clear to auscultation bilaterally, no crackles or wheezing.  Cardiovascular: Regular rate and rhythm, normal S1 and S2, no S3 or S4, and no murmur noted.  Neurologic: Awake, alert, oriented to name, place   Neuropsychiatric: upbeat mood    Peripheral IV 01/24/17 Right Lower forearm (Active)   Site Assessment WDL 1/25/2017 12:50 AM   Line Status Saline locked 1/25/2017 12:50 AM   Phlebitis Scale 0-->no symptoms 1/25/2017 12:50 AM   Infiltration Scale 0 1/25/2017 12:50 AM   Number of days:1     Plan to discontinue (See Orders)         Data:     Results for orders placed or performed during the hospital encounter of 01/23/17 (from the past 24 hour(s))   Echocardiogram    Narrative    ECHOCARDIOGRAM    M-mode, two-dimensional, color-flow, and Doppler studies were obtained  on this patient.  Standard views were utilized.    ORDERING PHYSICIAN:  Ana Ryan MD    INDICATIONS:  TIA.    Cardiac Dimensions                                    Normal  Dimensions  Aortic Root:                         33.5 mm      Aortic Root  Diameter: 20-37 mm  Left Atrium:                          39.2 mm      Left Atrium: 19-40  mm  Right Ventricle:                     Not measured  Right Ventricle:  7-23 mm  Left Ventricle end-diastole:   58.6 mm      Left Ventricle  end-diastole: 35-56 mm  Left Ventricle end-systole:    31.7 mm      Left Ventricle  end-systole: 35-56 mm  IVS end-diastole:                 9.3 mm      IVS end-diastole: 7-11  mm  LVPW:                                9.3 mm      LVPW: 7-11 mm    Review of the study shows there is no pericardial effusion.  The left  ventricle is borderline enlarged with normal systolic function.  Ejection fraction at 55%.  The left atrium is borderline enlarged,  volume is 65 mL, indexed it was 39 mL/meter2.  The right ventricle  appears normal on 2-D study.  The mitral valve has a mild-to-moderate  amount of mitral regurgitation.  The  aortic valve is structurally  normal.  There is a mild-to-moderate amount of tricuspid  regurgitation.  The peak systolic pressure of the right ventricle is  estimated at 28 plus right atrium.  The diastolic indices show  reversal of the E:A ratio at 0.89 suggesting diastolic dysfunction.    Continued on page 2            ASSESSMENT:  Echocardiographic study revealing  1.  Mild left ventricular enlargement.  Normal systolic function.  Ejection fraction at 55%.  2.  Mild left atrial enlargement.  3.  Normal right ventricular size and function.  4.  Mild-to-moderate mitral regurgitation.  5.  Normal aortic valve.  6.  Mild-to-moderate tricuspid regurgitation.  Peak systolic pressure  of the right ventricle is estimated at 28 plus right atrium.  7.  Evidence of diastolic dysfunction.  Exam Date: Jan 24, 2017 07:43:30 AM  Author: WILL CORBETT  This report is preliminary and transcribed     MR Brain w/o & w Contrast    Narrative    BRAIN MRI    HISTORY:  Visual field defect.    TECHNIQUE:  Sagittal T1, coronal post contrast enhanced T1 and axial  FLAIR, T2, diffusion, T1 and post contrast enhanced T1-weighted images  were obtained.    FINDINGS:  The ventricles, sulci and basilar cisterns are fairly  normal in size for a patient of this age.  No mass or midline shift is  seen.  There is no extraaxial fluid collection or focal hemorrhage.  There is no restricted diffusion to suggest an acute infarct.  No  abnormal enhancement is seen following contrast administration.    Normal flow void is seen in vessels at the skull base.  Orbits appear  normal and symmetric.  Midline structures are within normal limits.    IMPRESSION:  MILD ATROPHY WITHOUT ACUTE MASS EFFECT, HEMORRHAGE OR  ISCHEMIA.  Exam Date: Jan 24, 2017 08:31:40 AM  Author: CRISTINA VARGAS  This report is preliminary and transcribed       All cardiac studies reviewed by me.  All imaging studies reviewed by me.         Assessment and Plan:   Principal Problem:     CVA (cerebral vascular accident) (H)  No signif. Carotid stenosis. Echo unremarkable. Mri head no acute findings. Pt improving. But will benefit from home care nursing for medication compliance and speech. Referral placed yesterday for neurol and neuropsych testing  Active Problems:    TIA (transient ischemic attack)      Benign essential hypertension    Restart beta blocker on discharge. Had episode asymp bigem last pm    Mild recurrent major depression (H)   cont zoloft    Mild cognitive impairment with memory loss   as above    Elevated LDL cholesterol level  D/c simvast. Changed to lipitor max dose

## 2017-01-25 NOTE — PLAN OF CARE
Pt heart rhythm is bigemyeny. Pt encouraged to dangle at bedside, stand up and march in place. Converted back to NSR. Will continue to monitor.

## 2017-01-25 NOTE — PROGRESS NOTES
Spoke to [facility] who says pt is not eligible for homecare services because she is not homebound. She is not willing to pay out of pocket for these services and would prefer to use an outpatient service. Nadira at bedside and states she is willing to transport her to those appointments. Dr Ryan updated and will fax orders over.

## 2017-01-25 NOTE — PLAN OF CARE
Problem: Goal Outcome Summary  Goal: Goal Outcome Summary  Safe to return home   Outcome: Improving  Assessment and vital signs as charted. Speech clear, a&o x3. Neuro checks, WDL. Pt pleasant and cooperative with cares. Will continue to monitor.

## 2017-01-25 NOTE — PLAN OF CARE
Patient discharged at 10:41 AM via wheel chair accompanied by daughter and staff. Prescriptions sent to patients preferred pharmacy. All belongings sent with patient.     Discharge instructions reviewed with patient and daughter. Listed belongings gathered and returned to patient. yes    Patient discharged to home.   Report called to N/A    Core Measures and Vaccines  Core Measures applicable during stay: yes. If yes, state diagnosis: Stroke  Pneumonia and Influenza given prior to discharge, if indicated: N/A    Surgical Patient   Surgical Procedures during stay: N/A  Did patient receive discharge instruction on wound care and recognition of infection symptoms? N/A    MISC  Follow up appointment made:  Yes  Home and hospital aquired medications returned to patient: N/A  Patient reports pain was well managed at discharge: Yes

## 2017-01-25 NOTE — PLAN OF CARE
"Problem: Goal Outcome Summary  Goal: Goal Outcome Summary  Safe to return home   Outcome: Improving  Pt has been alert and oriented all shift. VSS. Continues on RA and ambulates with a SBA. IV is saline locked. Has had no c/o pain, no c/o n&v. Per ICU tele report she has been \" NSR in the 60's \" all neuros were intact. No garbled speech, no observed motor deficits. She has had adequate output during shift with no difficulty or issue. Has had one small loose stool during shift. She has remained free from all falls and/or injuries during shift.     Face to face report given with opportunity to observe patient.    Report given to Francisco Javier Mendoza RN  1/25/2017  12:26 AM              "

## 2017-01-25 NOTE — DISCHARGE SUMMARY
DATE OF ADMISSION:  01/24/2017      DATE OF DISCHARGE:  01/25/2017      DISCHARGE DIAGNOSES:   1.  Cerebrovascular accident with expressive aphasia and acute confusion secondary to cerebrovascular accident.   2.  Hypertension.   3.  Cognitive impairment, questionable underlying dementia.   4.  Hyperlipidemia.   5.  Depression with anxiety features.   6.  Right lateral homonymous hemianopsia secondary to #1.      PROCEDURES PERFORMED AND FINDINGS:     The patient had a brain MRI that showed mild atrophy without acute mass effect, hemorrhage or ischemia.        Echocardiogram showed mild left ventricular enlargement, normal systolic function, ejection fraction 55%, mild to moderate mitral regurgitation.  Normal aortic valve.  Mild to moderate tricuspid regurgitation.        Carotid ultrasound showed mixed calcified and noncalcified plaque of the common carotid artery however no hemodynamic stenosis.        CT angiogram of the neck was negative; 50% stenosis in the proximal internal carotid on the right and 70% on the left.      DISCHARGE MEDICATIONS:   1.  Lipitor 80 mg daily.   2.  Aspirin full dose 325 mg daily.   3.  Allopurinol 300 mg daily.   4.  BuSpar 30 mg every a.m.   5.  Lasix 40 mg b.i.d.   6.  Synthroid 50 mcg daily.   7.  Sertraline 50 mg daily.   8.  Metoprolol 50 mg daily.      DISCHARGE DIET:  Low fat, 2 gram sodium diet.      DISCHARGE ACTIVITY:  Home with home care nursing to ensure medication compliance and speech therapy for the expressive aphasia.      DISCHARGE PHYSICAL EXAMINATION:   VITAL SIGNS:  Blood pressure is 107/59, heart rate is 69, temperature is 96.6, respiratory rate 16, 95% on room air.   GENERAL:  This is an elderly female appearing much older than her stated age but in no obvious distress.   HEENT:  Eyes without injection.   CARDIOVASCULAR:  Regular rate and rhythm.   LUNGS:  Clear.   EXTREMITIES:  Without edema.   NEUROLOGIC:  The patient has no apparent word finding difficulty at  this time.  She does not have any gaze defect.        Followup schedule in 1 week with Felipa Cool, nurse practitioner.  She also has been referred for outpatient neuropsychological testing with Dr. Pramod Oliver and Neurology with Dr. Dale Woodard.      HOSPITAL COURSE:  This 71-year-old female who was having acute confusion and word finding difficulties presented to the emergency room.  The concern initially was a TIA; however, symptoms did not resolve immediately.  She was made an inpatient because of the likely CVA status however her MRI did not document any acute findings that would be consistent with the left middle cerebral artery distribution to the Broca's area.  The patient did quite well during her hospitalization.  She was felt safe to go home based on the therapy assessments.  Her blood pressure was not treated during this hospitalization to ensure cerebral perfusion however she was discharged on her beta blocker only because her blood pressure was trending lower.  She did have 1 episode of bigeminy prior to discharge, but no other arrhythmias.  Her cholesterol panel was also done during this hospitalization and revealed a markedly elevated LDL, question of compliance with her statin therapy.  Her LDL was 192 so she will be discharged instead of on Simvastatin on Lipitor 80 mg a day and that can be followed up by her primary provider in 3 months.  Otherwise, her Zoloft and thyroid medications were continued and she was discharged to home in good condition with home care.         CARL BRANCH MD             D: 2017 09:28   T: 2017 09:45   MT:       Name:     JACKY LOMEDO   MRN:      [id number]        Account:        [id number]   :      1945           Admit Date:                                       Discharge Date:       Document: [id number]

## 2017-01-25 NOTE — PLAN OF CARE
Face to face report given with opportunity to observe patient.    Report given to Aishwarya Prabhakar   1/25/2017  7:21 AM

## 2017-01-26 ENCOUNTER — HOSPITAL ENCOUNTER (OUTPATIENT)
Dept: SPEECH THERAPY | Facility: HOSPITAL | Age: 72
Setting detail: THERAPIES SERIES
End: 2017-01-26
Attending: NURSE PRACTITIONER
Payer: MEDICARE

## 2017-01-26 DIAGNOSIS — I63.319 CEREBROVASCULAR ACCIDENT (CVA) DUE TO THROMBOSIS OF MIDDLE CEREBRAL ARTERY, UNSPECIFIED BLOOD VESSEL LATERALITY (H): Primary | ICD-10-CM

## 2017-01-26 PROCEDURE — 40000211 ZZHC STATISTIC SLP  DEPARTMENT VISIT

## 2017-01-26 PROCEDURE — G9165 ATTEN CURRENT STATUS: HCPCS | Mod: GN,CL

## 2017-01-26 PROCEDURE — G9166 ATTEN GOAL STATUS: HCPCS | Mod: GN,CH

## 2017-01-26 PROCEDURE — 92523 SPEECH SOUND LANG COMPREHEN: CPT | Mod: GN

## 2017-01-27 ENCOUNTER — TELEPHONE (OUTPATIENT)
Dept: CASE MANAGEMENT | Facility: HOSPITAL | Age: 72
End: 2017-01-27

## 2017-01-27 NOTE — TELEPHONE ENCOUNTER
Vanesa Fleming, was discharged to home on  from [facility]. I spoke today with her regarding the patient's discharge.   She indicates they have receive(d) sufficient information upon discharge. Medications were reviewed in full on discharge, including: Medications to be started; medications to be stopped; medications to be continued from preadmission and any side effects. Prescriptions were received at discharge and were able to be filled. Medications are being taken as prescribed.   She was very unsure about her follow up appointment. A call was made to Nadira who says it is scheduled for Feb 1st and she will be bringing her there. They are able to confirm their appointment time and have it written down.

## 2017-01-28 ASSESSMENT — ENCOUNTER SYMPTOMS: DIARRHEA: 1

## 2017-01-30 NOTE — UTILIZATION REVIEW
"Admission Status; Secondary Review Determination      Primary Insurance:     Date Admitted:  01/23/2017   Date(s) Reviewed:  01/24/2017   Attending Physician:      Physician Advisor:         Under the authority of the Utilization Management Committee, the utilization review process indicated a secondary review on the above patient. The review outcome is based on review of the medical records, discussions with staff, and applying clinical experience noted on the date of the review.             (X) Inpatient Status Appropriate - This patient's medical care is consistent with medical management for inpatient care and reasonable inpatient medical practice.            ( ) Observation Status Appropriate - This patient does not meet hospital inpatient criteria and is placed in observation status. If this patient's primary payer is Medicare and was admitted as an inpatient, Condition Code 44 should be used and patient status changed to \"observation\".                  ( ) Admission Status NOT Appropriate - This patient's medical care is not consistent with medical management for Inpatient or Observation Status.                  ( ) Outpatient Procedure Appropriate - This patient's medical care is consistent with medical management for outpatient procedure.                   ( ) Observation Concurrent Stay Review                  ( ) Inpatient Concurrent Stay Review                     RATIONALE FOR DETERMINATION      Vanesa Fleming is a 71-year-old female who developed acute onset of confusion and nonsensical speech and an inability to utilize her extremities properly.  The patient is brought into the emergency room where there were no acute findings noted on CT imaging but the patient continues to have significant confusion as well as acute visual field losses.  With the patient's persistent neurological changes, the patient is going to require a minimum of 2 nights in the hospital, appropriate for inpatient management.     "   The information on this document is developed by the utilization review team in order for the business office to ensure compliance. This only denotes the appropriateness of proper admission status and does not reflect the quality of care rendered.       The definitions of Inpatient Status and Observation Status used in making the determination above are those provided in the CMS Coverage Manual, Chapter 1 and Chapter 6, section 70.4.         LESLEY DOWNEY MD             D: 2017 17:19   T: 2017 12:19   MT: GEENA      Name:     JACKY OLMEDO   MRN:      [id number]        Account:        [id number]   :      1945           Admit Date:                                       Discharge Date: 2017      Document: [id number]

## 2017-01-31 ENCOUNTER — HOSPITAL ENCOUNTER (OUTPATIENT)
Dept: SPEECH THERAPY | Facility: HOSPITAL | Age: 72
Setting detail: THERAPIES SERIES
End: 2017-01-31
Attending: NURSE PRACTITIONER
Payer: MEDICARE

## 2017-01-31 PROCEDURE — 40000211 ZZHC STATISTIC SLP  DEPARTMENT VISIT

## 2017-01-31 PROCEDURE — 92507 TX SP LANG VOICE COMM INDIV: CPT | Mod: GN

## 2017-01-31 NOTE — PROGRESS NOTES
01/26/17 1600       Present No   Comments Patient attended session alone   General Information   Type of Evaluation Cognitive-Linguistic   Type Of Visit Initial   Start Of Care Date 01/26/17   Referring Physician Dr. Ana Ryan   Orders Evaluate And Treat   Orders Comment Expressive Aphasia   Medical Diagnosis CVA   Onset Of Illness/injury Or Date Of Surgery 01/23/16   Hearing WFL   Surgical/Medical history reviewed Yes   Language: Auditory Comprehension (understanding of spoken language)   Tests were administered at the following levels Moderate (routine daily activities)   One Step Commands (out of 10 total) 6   Functional Assessment Scale (Auditory Comprehension) Moderate Impairment   Comments (Auditory Comprehension) Patient was instructed to provide information and had difficulty maintaining topic in 50% of opportunities   Language: Verbal Expression (use of spoken language to express information)   Tests were administered at the following levels Moderate (routine daily activities)   Comments (Verbal Expression) Patient demonstrated word finding difficulties 5x during a 10 minute conversation and was unable to maintain topic in 50% of opportunities   Cognitive Status Examination   Attention impaired   Behavioral Observations distractible;delayed processing;confused   Short Term Memory impaired   Long Term Memory intact   Reasoning intact   Organization impaired thought organization at numerical and conversational levels   Executive Function Deficits Noted organization;planning;self-correction;self-monitoring   Additional cognitive-linguistic evaluation indicated  recommend;RIPA-2   Standardized cognitive-linguistic assessment completed RIPA-2  (A portion of RIPA-2 completed.)   Cognitive Status Exam Comments Patient demonstrating difficulty attending to tasks and conversation which impairs her interaction across contexts.   General Therapy Interventions   Planned Therapy  Interventions Cognitive Treatment;Language   Cognitive treatment Internal memory strategy training;External memory strategy training;Progressive attention training   Language Auditory comprehension;Verbal expression;Written expression   Clinical Impression, SLP Eval   Criteria for Skilled Therapeutic Interventions Met yes;treatment indicated   SLP Diagnosis Cognitive-Linguistic Impairment   Functional limitations due to impairments Patient unable to perform activities of daily living with accuracy   Therapy Frequency 2 times;per week   Predicted Duration of Therapy Intervention (days/wks) 10 weeks   Risks and Benefits of Treatment have been explained. Yes   Patient, Family & other staff in agreement with plan of care Yes   Clinical Impression Comments Patient demonstrating impairments in attention, thought organization, self-monitoring and evaluation, topic maintenance, and word finding.  These difficulties combined impair the patient's ability to interact successfully across communicative contexts and environments.   Cognitive/Communication Goals   Cognitive/Communication Goals 1;2;3;4   Cognitive/Communication Goal 1   Goal Identifier LTG   Goal Description Patient will increase cognitive linguistic goals to previous functioning to increase performance in activities of daily living (ADL) and decrease barriers to interaction across communicative contexts   Target Date 04/06/17   Cognitive/Communication Goal 2   Goal Identifier STG 1   Goal Description Patient will maintain topic in 5/5 opportunities following minimal support   Target Date 03/09/17   Cognitive/Communication Goal 3   Goal Identifier STG 2   Goal Description Patient will demonstrate word finding success in 4/5 opportunities following minimal support   Target Date 03/23/17   Cognitive/Communication Goal 4   Goal Identifier STG 3   Goal Description Patient will perform simple calculations with 100% accuracy following minimal support   Target Date  03/09/16   Total Session Time   Total Evaluation Time 60 minutes   Therapy Certification   Certification date from 01/26/17   Medical Diagnosis CVA   Certification I certify the need for these services furnished under this plan of treatment and while under my care.  (Physician co-signature of this document indicates review and certification of the therapy plan).   SLP Medicare Only G-code   G-code Attention   Attention   Attention: Current Status , Goal , Discharge -Fjkr Only-Modifier the same for all G-codes CL: 60-79% impairment   Attention: Current & Discharge Modifier Rationale-Eval Only Clinical observation     I certify the need for these services furnished under this plan of treatment and while under my care. (Physician co-signature of this document indicates review and certification of the therapy plan).      _____________________________     __________________________    ____________  Physician's Signature                 Date               Time

## 2017-02-02 ENCOUNTER — HOSPITAL ENCOUNTER (OUTPATIENT)
Dept: SPEECH THERAPY | Facility: HOSPITAL | Age: 72
Setting detail: THERAPIES SERIES
End: 2017-02-02
Attending: NURSE PRACTITIONER
Payer: MEDICARE

## 2017-02-02 PROCEDURE — 92507 TX SP LANG VOICE COMM INDIV: CPT | Mod: GN

## 2017-02-02 PROCEDURE — 40000211 ZZHC STATISTIC SLP  DEPARTMENT VISIT

## 2017-02-06 NOTE — DISCHARGE INSTRUCTIONS
What to expect when you have contrast    During your exam, we will inject  contrast  into your vein or artery. (Contrast is a clear liquid with iodine in it. It shows up on X-rays.)    You may feel warm or hot. You may have a metal taste in your mouth and a slight upset stomach. You may also feel pressure near the kidneys and bladder. These effects will last about 1 to 3 minutes.    Please tell us if you have:    Sneezing     Itching    Hives     Swelling in the face    A hoarse voice    Breathing problems    Other new symptoms    Serious problems are rare.  They may include:    Irregular heartbeat     Seizures    Kidney failure              Tissue damage    Shock      Death    If you have any problems during the exam, we  will treat them right away.    When you get home    Call your hospital if you have any new symptoms in the next 2 days, like hives or swelling. (Phone numbers are at the bottom of this page.) Or call your family doctor.     If you have wheezing or trouble breathing, call 911.    Self-care  -Drink at least 4 extra glasses of water today.   This reduces the stress on your kidneys.  -Keep taking your regular medicines.    The contrast will pass out of your body in your  Urine(pee). This will happen in the next 24 hours. You  will not feel this. Your urine will not  change color.    If you have kidney problems or take metformin    Drink 4 to 8 large glasses of water for the next  2 days, if you are not on a fluid restriction.    ?If you take metformin (Glucophage or Glucovance) for diabetes, keep taking it.      ?Your kidney function tests are abnormal.  If you take Metformin, do not take it for 48 hours. Please go to your clinic for a blood test within 3 days after your exam before the restarting this medicine.     (Note to provider:please give patient prescription for lab tests.)    ?Special instructions: ***    I have read and understand the above information.    Patient Sign  Here:______________________________________Date:________Time:______    Staff Sign Here:________________________________________Date:_______Time:______      Radiology Departments:     ?[facility]: [phone] ?[facility]: [phone]     ?[facility]: [phone] ?[facility]:[phone]      ?[facility]: [phone]  ?[facility]: [phone]  ?[facility]:[phone]    ?[facility]:[phone]  ?[facility]:[phone] Contrast Discharge Instructions    The IV contrast you received today will pass out of your body in your  urine. This will happen in the next 24 hours. You will not feel this process.  Your urine will not change color.    Drink at least 4 extra glasses of water or juice today (unless your doctor  has restricted your fluids). This reduces the stress on your kidneys.  You may take your regular medicines.    If you are on dialysis: It is best to have dialysis today.    If you have a reaction: Most reactions happen right away. If you have  any new symptoms after leaving the hospital (such as hives or swelling),  call your hospital at the correct number below. Or call your family doctor.  If you have breathing distress or wheezing, call 911.    Special instructions: ***    I have read and understand the above information.    Signature:______________________________________ Date:___2-87-92________    Staff:__________________________________________ Date:___________  You have a follow up appointment with Felipa Steven on  at 10:40 in the morning thank you.    Time:__________    [place] Rad[facility] Departments:    ___[facility]: [phone]  ___[facility]: [phone]  ___[facility]: [phone] ___[facility]: [phone]  ___[facility]: [phone]  ___[facility]: [phone]  ___[facility]: [phone]  ___[facility]: [phone]  ___[phone]

## 2017-02-09 ENCOUNTER — HOSPITAL ENCOUNTER (OUTPATIENT)
Dept: SPEECH THERAPY | Facility: HOSPITAL | Age: 72
Setting detail: THERAPIES SERIES
End: 2017-02-09
Attending: NURSE PRACTITIONER
Payer: MEDICARE

## 2017-02-09 PROCEDURE — 92507 TX SP LANG VOICE COMM INDIV: CPT | Mod: GN

## 2017-02-09 PROCEDURE — 40000211 ZZHC STATISTIC SLP  DEPARTMENT VISIT

## 2017-02-16 ENCOUNTER — HOSPITAL ENCOUNTER (OUTPATIENT)
Dept: SPEECH THERAPY | Facility: HOSPITAL | Age: 72
Setting detail: THERAPIES SERIES
End: 2017-02-16
Attending: NURSE PRACTITIONER
Payer: MEDICARE

## 2017-02-16 PROCEDURE — 40000211 ZZHC STATISTIC SLP  DEPARTMENT VISIT

## 2017-02-16 PROCEDURE — 92507 TX SP LANG VOICE COMM INDIV: CPT | Mod: GN

## 2017-03-19 ENCOUNTER — HOSPITAL ENCOUNTER (EMERGENCY)
Facility: HOSPITAL | Age: 72
Discharge: HOME | End: 2017-03-20
Attending: PHYSICIAN ASSISTANT | Admitting: EMERGENCY MEDICINE
Payer: MEDICARE

## 2017-03-19 DIAGNOSIS — J06.9 VIRAL URI: ICD-10-CM

## 2017-03-19 LAB
ALBUMIN SERPL-MCNC: 3.6 G/DL (ref 3.4–5)
ALP SERPL-CCNC: 115 U/L (ref 40–150)
ALT SERPL W P-5'-P-CCNC: 25 U/L (ref 0–50)
ANION GAP SERPL CALCULATED.3IONS-SCNC: 9 MMOL/L (ref 3–14)
AST SERPL W P-5'-P-CCNC: 29 U/L (ref 0–45)
BASOPHILS # BLD AUTO: 0 10E9/L (ref 0–0.2)
BASOPHILS NFR BLD AUTO: 0.2 %
BILIRUB SERPL-MCNC: 0.5 MG/DL (ref 0.2–1.3)
BUN SERPL-MCNC: 15 MG/DL (ref 7–30)
CALCIUM SERPL-MCNC: 8.8 MG/DL (ref 8.5–10.1)
CHLORIDE SERPL-SCNC: 99 MMOL/L (ref 94–109)
CO2 SERPL-SCNC: 31 MMOL/L (ref 20–32)
CREAT SERPL-MCNC: 1.31 MG/DL (ref 0.52–1.04)
DIFFERENTIAL METHOD BLD: ABNORMAL
EOSINOPHIL # BLD AUTO: 0.1 10E9/L (ref 0–0.7)
EOSINOPHIL NFR BLD AUTO: 0.7 %
ERYTHROCYTE [DISTWIDTH] IN BLOOD BY AUTOMATED COUNT: 14.1 % (ref 10–15)
FLUAV+FLUBV AG SPEC QL: NEGATIVE
FLUAV+FLUBV AG SPEC QL: NORMAL
GFR SERPL CREATININE-BSD FRML MDRD: 40 ML/MIN/1.7M2
GLUCOSE SERPL-MCNC: 127 MG/DL (ref 70–99)
HCT VFR BLD AUTO: 37.1 % (ref 35–47)
HGB BLD-MCNC: 12.7 G/DL (ref 11.7–15.7)
IMM GRANULOCYTES # BLD: 0 10E9/L (ref 0–0.4)
IMM GRANULOCYTES NFR BLD: 0.3 %
LACTATE SERPL-SCNC: 1.9 MMOL/L (ref 0.4–2)
LYMPHOCYTES # BLD AUTO: 1 10E9/L (ref 0.8–5.3)
LYMPHOCYTES NFR BLD AUTO: 8.5 %
MCH RBC QN AUTO: 30.4 PG (ref 26.5–33)
MCHC RBC AUTO-ENTMCNC: 34.2 G/DL (ref 31.5–36.5)
MCV RBC AUTO: 89 FL (ref 78–100)
MONOCYTES # BLD AUTO: 0.5 10E9/L (ref 0–1.3)
MONOCYTES NFR BLD AUTO: 4.3 %
NEUTROPHILS # BLD AUTO: 10 10E9/L (ref 1.6–8.3)
NEUTROPHILS NFR BLD AUTO: 86 %
NRBC # BLD AUTO: 0 10*3/UL
NRBC BLD AUTO-RTO: 0 /100
PLATELET # BLD AUTO: 254 10E9/L (ref 150–450)
POTASSIUM SERPL-SCNC: 3.4 MMOL/L (ref 3.4–5.3)
PROT SERPL-MCNC: 7.6 G/DL (ref 6.8–8.8)
RBC # BLD AUTO: 4.18 10E12/L (ref 3.8–5.2)
SODIUM SERPL-SCNC: 139 MMOL/L (ref 133–144)
SPECIMEN SOURCE: NORMAL
WBC # BLD AUTO: 11.7 10E9/L (ref 4–11)

## 2017-03-19 PROCEDURE — 96374 THER/PROPH/DIAG INJ IV PUSH: CPT

## 2017-03-19 PROCEDURE — 99284 EMERGENCY DEPT VISIT MOD MDM: CPT | Performed by: EMERGENCY MEDICINE

## 2017-03-19 PROCEDURE — 25000132 ZZH RX MED GY IP 250 OP 250 PS 637: Mod: GY | Performed by: PHYSICIAN ASSISTANT

## 2017-03-19 PROCEDURE — 87804 INFLUENZA ASSAY W/OPTIC: CPT | Mod: 59 | Performed by: FAMILY MEDICINE

## 2017-03-19 PROCEDURE — 83605 ASSAY OF LACTIC ACID: CPT | Performed by: PHYSICIAN ASSISTANT

## 2017-03-19 PROCEDURE — A9270 NON-COVERED ITEM OR SERVICE: HCPCS | Mod: GY | Performed by: PHYSICIAN ASSISTANT

## 2017-03-19 PROCEDURE — 36415 COLL VENOUS BLD VENIPUNCTURE: CPT | Performed by: PHYSICIAN ASSISTANT

## 2017-03-19 PROCEDURE — 25000128 H RX IP 250 OP 636: Performed by: PHYSICIAN ASSISTANT

## 2017-03-19 PROCEDURE — 71020 ZZHC CHEST TWO VIEWS, FRONT/LAT: CPT | Mod: TC

## 2017-03-19 PROCEDURE — 87040 BLOOD CULTURE FOR BACTERIA: CPT | Mod: 59 | Performed by: PHYSICIAN ASSISTANT

## 2017-03-19 PROCEDURE — 85025 COMPLETE CBC W/AUTO DIFF WBC: CPT | Performed by: PHYSICIAN ASSISTANT

## 2017-03-19 PROCEDURE — 99284 EMERGENCY DEPT VISIT MOD MDM: CPT | Mod: 25

## 2017-03-19 PROCEDURE — 80053 COMPREHEN METABOLIC PANEL: CPT | Performed by: PHYSICIAN ASSISTANT

## 2017-03-19 RX ORDER — ONDANSETRON 2 MG/ML
4 INJECTION INTRAMUSCULAR; INTRAVENOUS ONCE
Status: COMPLETED | OUTPATIENT
Start: 2017-03-19 | End: 2017-03-19

## 2017-03-19 RX ORDER — ACETAMINOPHEN 325 MG/1
975 TABLET ORAL ONCE
Status: COMPLETED | OUTPATIENT
Start: 2017-03-19 | End: 2017-03-19

## 2017-03-19 RX ADMIN — ACETAMINOPHEN 975 MG: 325 TABLET, FILM COATED ORAL at 22:10

## 2017-03-19 RX ADMIN — ONDANSETRON 4 MG: 2 INJECTION INTRAMUSCULAR; INTRAVENOUS at 22:10

## 2017-03-19 RX ADMIN — SODIUM CHLORIDE 1000 ML: 9 INJECTION, SOLUTION INTRAVENOUS at 22:10

## 2017-03-19 ASSESSMENT — ENCOUNTER SYMPTOMS
WHEEZING: 0
NAUSEA: 1
COUGH: 1
FATIGUE: 1
PALPITATIONS: 0
STRIDOR: 0
NEUROLOGICAL NEGATIVE: 1
BACK PAIN: 0
NECK STIFFNESS: 0
NECK PAIN: 0
ABDOMINAL PAIN: 0
CHEST TIGHTNESS: 0
SORE THROAT: 0
VOMITING: 1
SHORTNESS OF BREATH: 0
CHILLS: 0

## 2017-03-19 NOTE — ED AVS SNAPSHOT
[facility]    [address] [address]    Phone:  [phone]                                       Vanesa Fleming   MRN: [id number]    Department:  [facility]   Date of Visit:  3/19/2017           Patient Information     Date Of Birth          1945        Your diagnoses for this visit were:     Viral URI        You were seen by Marlene Roberson PA-C.      Follow-up Information     Follow up with Felipa Cool NP In 2 days.    Specialty:  Nurse Practitioner    Why:  Re-evaluation    Contact information:    [facility]  [address] [facility] [id number] [phone]        Discharge References/Attachments     URI, VIRAL, NO ABX (ADULT) (ENGLISH)         Review of your medicines      Our records show that you are taking the medicines listed below. If these are incorrect, please call your family doctor or clinic.        Dose / Directions Last dose taken    ALLOPURINOL PO   Dose:  300 mg        Take 300 mg by mouth daily   Refills:  0        aspirin 325 MG tablet   Dose:  325 mg        Take 1 tablet (325 mg) by mouth daily   Refills:  0        atorvastatin 80 MG tablet   Commonly known as:  LIPITOR   Dose:  80 mg   Quantity:  90 tablet        Take 1 tablet (80 mg) by mouth daily   Refills:  3        BUSPIRONE HCL PO   Dose:  30 mg        Take 30 mg by mouth   Refills:  0        LASIX 40 MG tablet   Dose:  40 mg   Generic drug:  furosemide        Take 40 mg by mouth 2 times daily   Refills:  0        LEVOTHYROXINE SODIUM PO   Dose:  50 mcg        Take 50 mcg by mouth daily   Refills:  0        METOPROLOL SUCCINATE ER PO   Dose:  50 mg        Take 50 mg by mouth daily   Refills:  0        SERTRALINE HCL PO   Dose:  50 mg        Take 50 mg by mouth daily   Refills:  0                Procedures and tests performed during your visit     Procedure/Test Number of Times Performed    Blood culture 2    CBC with platelets differential 1    Chest XR,  PA &  LAT 1    Comprehensive metabolic panel 1    Influenza A/B antigen 1    Lactic acid 1    Peripheral IV catheter 1      Orders Needing Specimen Collection     None      Pending Results     Date and Time Order Name Status Description    3/19/2017 2203 Blood culture In process     3/19/2017 2142 Blood culture In process     3/19/2017 2142 Chest XR,  PA & LAT In process             Pending Culture Results     Date and Time Order Name Status Description    3/19/2017 2203 Blood culture In process     3/19/2017 2142 Blood culture In process             Thank you for choosing [facility]       Thank you for choosing [facility] for your care. Our goal is always to provide you with excellent care. Hearing back from our patients is one way we can continue to improve our services. Please take a few minutes to complete the written survey that you may receive in the mail after you visit with us. Thank you!        [employer] Information     [employer] gives you secure access to your electronic health record. If you see a primary care provider, you can also send messages to your care team and make appointments. If you have questions, please call your primary care clinic.  If you do not have a primary care provider, please call [phone] and they will assist you.        Care EveryWhere ID     This is your Care EveryWhere ID. This could be used by other organizations to access your [facility] medical records  [id number]        After Visit Summary       This is your record. Keep this with you and show to your community pharmacist(s) and doctor(s) at your next visit.

## 2017-03-19 NOTE — ED AVS SNAPSHOT
[facility]    [address] [address]    Phone:  [phone]                                       Vanesa Fleming   MRN: [id number]    Department:  [facility]   Date of Visit:  3/19/2017           After Visit Summary Signature Page     I have received my discharge instructions, and my questions have been answered. I have discussed any challenges I see with this plan with the nurse or doctor.    ..........................................................................................................................................  Patient/Patient Representative Signature      ..........................................................................................................................................  Patient Representative Print Name and Relationship to Patient    ..................................................               ................................................  Date                                            Time    ..........................................................................................................................................  Reviewed by Signature/Title    ...................................................              ..............................................  Date                                                            Time

## 2017-03-20 VITALS
DIASTOLIC BLOOD PRESSURE: 55 MMHG | WEIGHT: 146 LBS | OXYGEN SATURATION: 94 % | RESPIRATION RATE: 20 BRPM | SYSTOLIC BLOOD PRESSURE: 101 MMHG | BODY MASS INDEX: 28.51 KG/M2 | HEART RATE: 69 BPM | TEMPERATURE: 99.4 F

## 2017-03-20 NOTE — ED NOTES
Face to face report given with opportunity to observe patient.  Report given to CARMINE Puente.    TIBURCIO SANDOVAL  3/19/2017, 11:02 PM

## 2017-03-20 NOTE — ED PROVIDER NOTES
History     Chief Complaint   Patient presents with     Dizziness     worse with position changes     Vomiting     x2 - vomited 200 mls in triage     Fatigue     states she is stressed and exhausted due to  being in the hospital diagnosed lung cancer     Fever     fever and chills started at 1930     HPI  Vanesa Fleming is a 71 year old female who presents with cough, fever and fatigue x 4 days. Became nauseous in triage and vomited x 1. She has been very stressed as of late due to her  being hospitalized for cancer. She did not know she had a fever at home but is 103 here. Denies CP or dyspnea. Accompanied by her son. Denies abdominal pain.    I have reviewed the Medications, Allergies, Past Medical and Surgical History, and Social History in the Epic system.    Review of Systems   Constitutional: Positive for fatigue. Negative for chills.   HENT: Negative for congestion and sore throat.    Respiratory: Positive for cough. Negative for chest tightness, shortness of breath, wheezing and stridor.    Cardiovascular: Negative for chest pain, palpitations and leg swelling.   Gastrointestinal: Positive for nausea and vomiting. Negative for abdominal pain.   Genitourinary: Negative.    Musculoskeletal: Negative for back pain, neck pain and neck stiffness.   Skin: Negative.    Neurological: Negative.    All other systems reviewed and are negative.     Past Medical History: No past medical history on file.    No past surgical history on file.    Social History     Social History     Marital status:      Spouse name: N/A     Number of children: N/A     Years of education: N/A     Occupational History     Not on file.     Social History Main Topics     Smoking status: Not on file     Smokeless tobacco: Not on file     Alcohol use Not on file     Drug use: Not on file     Sexual activity: Not on file     Other Topics Concern     Not on file     Social History Narrative       Patient's Medications   New  Prescriptions    No medications on file   Previous Medications    ALLOPURINOL PO    Take 300 mg by mouth daily    ASPIRIN 325 MG TABLET    Take 1 tablet (325 mg) by mouth daily    ATORVASTATIN (LIPITOR) 80 MG TABLET    Take 1 tablet (80 mg) by mouth daily    BUSPIRONE HCL PO    Take 30 mg by mouth    FUROSEMIDE (LASIX) 40 MG TABLET    Take 40 mg by mouth 2 times daily    LEVOTHYROXINE SODIUM PO    Take 50 mcg by mouth daily    METOPROLOL SUCCINATE ER PO    Take 50 mg by mouth daily    SERTRALINE HCL PO    Take 50 mg by mouth daily   Modified Medications    No medications on file   Discontinued Medications    No medications on file       Allergies: Review of patient's allergies indicates no known allergies.      Physical Exam   BP: 139/74  Heart Rate: 100  Temp: (!) 103  F (39.4  C)  Resp: 20  Weight: 66.2 kg (146 lb)  SpO2: 93 %  Physical Exam   Constitutional: She is oriented to person, place, and time. She appears well-developed and well-nourished. No distress.   HENT:   Head: Normocephalic and atraumatic.   Right Ear: External ear normal.   Left Ear: External ear normal.   Nose: Nose normal.   Mouth/Throat: Oropharynx is clear and moist. No oropharyngeal exudate.   Eyes: Conjunctivae and EOM are normal. Pupils are equal, round, and reactive to light. Right eye exhibits no discharge. Left eye exhibits no discharge. No scleral icterus.   Neck: Normal range of motion. Neck supple.   Cardiovascular: Normal rate, regular rhythm, normal heart sounds and intact distal pulses.  Exam reveals no gallop and no friction rub.    No murmur heard.  Pulmonary/Chest: Effort normal and breath sounds normal. No respiratory distress. She has no wheezes. She has no rales. She exhibits no tenderness.   Frequent, dry cough.   Abdominal: Soft. Bowel sounds are normal. There is no tenderness.   Musculoskeletal: She exhibits no edema.   Lymphadenopathy:     She has no cervical adenopathy.   Neurological: She is alert and oriented to  person, place, and time. No cranial nerve deficit. Coordination normal.   Skin: Skin is warm and dry. No rash noted. She is not diaphoretic. No erythema. No pallor.   Psychiatric: She has a normal mood and affect. Her behavior is normal. Judgment and thought content normal.   Nursing note and vitals reviewed.      ED Course     ED Course   Normal CBC, CMP, lactic acid, chest x-ray.  Negative influenza A and B.    Procedures             Labs Ordered and Resulted from Time of ED Arrival Up to the Time of Departure from the ED   CBC WITH PLATELETS DIFFERENTIAL - Abnormal; Notable for the following:        Result Value    WBC 11.7 (*)     Absolute Neutrophil 10.0 (*)     All other components within normal limits   COMPREHENSIVE METABOLIC PANEL - Abnormal; Notable for the following:     Glucose 127 (*)     Creatinine 1.31 (*)     GFR Estimate 40 (*)     GFR Estimate If Black 48 (*)     All other components within normal limits   LACTIC ACID   PERIPHERAL IV CATHETER   INFLUENZA A/B ANTIGEN   BLOOD CULTURE   BLOOD CULTURE       Assessments & Plan (with Medical Decision Making)   Despite her high temp of 103, pt is actually quite well appearing. She has a dry cough. Lungs are CTA. No abdominal tenderness. She was given Tylenol 975mg PO for fever. NS 1 liter bolus and Zofran 4mg IV also given. She was signed out to Dr. Louis at 2200 at the end of my shift pending labs and CXR. I suspect influenza.     I have reviewed the nursing notes.    I have reviewed the findings, diagnosis, plan and need for follow up with the patient.    New Prescriptions    No medications on file       Final diagnoses:   Viral URI     Patient discharged home.  Advised to take Tylenol when necessary fever and drink plenty of fluids.  Rest and avoid contact with other people until fever free.    3/19/2017   [facility]     Tylor Louis MD  03/20/17 0012

## 2017-03-20 NOTE — PROGRESS NOTES
Vanesa CHERYL Lonnie 71 year old    Returned from di  Exam performed: xray  Tolerated Procedure: well  May resume previous diet: Yes  Pushed to radiologist reading service? Not applicable  Time returned to unit: 1053pm  Handoff Method: Call Light on and in reach of patient   Was patient held? NO  If yes, by whom?   3/19/2017 10:53 PM  Mago Dickson

## 2017-03-25 LAB
BACTERIA SPEC CULT: NORMAL
BACTERIA SPEC CULT: NORMAL
Lab: NORMAL
Lab: NORMAL
MICRO REPORT STATUS: NORMAL
MICRO REPORT STATUS: NORMAL
SPECIMEN SOURCE: NORMAL
SPECIMEN SOURCE: NORMAL

## 2017-03-28 NOTE — PROGRESS NOTES
Outpatient Speech Language Pathology Discharge Note     Patient: Vanesa Fleming  : 1945    Beginning/End Dates of Reporting Period:  2017 to 3/28/2017    Referring Provider: Dr. Ana Ryan    Therapy Diagnosis: Cognitive Linguistic Impairment    Client Self Report:   Patient reported increased attention to conversation and decreased frequency of word finding difficulties.    Objective Measurements:   Objective Measures: Objective Measure 4  Objective Measure: Topic Maintenance  Details: 100%  Objective Measure: Word Finding  Details: 2x  Objective Measure: Calculations  Details: 8/10 money counting  Objective Measure: Naming  Details: 100%                Goals:  Goal Identifier LTG   Goal Description Patient will increase cognitive linguistic goals to previous functioning to increase performance in activities of daily living (ADL) and decrease barriers to interaction across communicative contexts   Target Date 17   Date Met      Progress:  Patient demonstrating cognitive linguistic functioning at 80% previous functioning.     Goal Identifier STG 1   Goal Description Patient will maintain topic in 5/5 opportunities following minimal support   Target Date 17   Date Met  17   Progress:  Goal met.  Patient demonstrating consistent ability to maintain attention to topic of discussion.     Goal Identifier STG 2   Goal Description Patient will demonstrate word finding success in 4/5 opportunities following minimal support   Target Date 17   Date Met     Progress:  Goal partially met.  Patient demonstrating word finding success in 80% of opportunities.     Goal Identifier STG 3   Goal Description Patient will perform simple calculations with 100% accuracy following minimal support   Target Date 16   Date Met      Progress:  Patient preforming simple calculations in structured tasks with 80% success.         Progress Toward Goals:   Progress this reporting period: Patient was  progressing toward goals while attending speech language pathology services.  Patient has since discontinued services due to a family emergency.        Plan:  Discharge from therapy.    Discharge:    Reason for Discharge: Patient chooses to discontinue therapy.  Medicare G-code: Patient did not attend a final scheduled session prior to discharge. Unable to determine discharge functional status.    Equipment Issued: none    Discharge Plan: Patient to continue home program of semantic word mapping for word finding difficulties.

## 2017-04-05 PROBLEM — Z71.89 ACP (ADVANCE CARE PLANNING): Chronic | Status: ACTIVE | Noted: 2017-04-05

## 2017-07-29 ENCOUNTER — DOCUMENTATION ONLY (OUTPATIENT)
Dept: OTHER | Facility: CLINIC | Age: 72
End: 2017-07-29

## 2017-07-29 DIAGNOSIS — Z71.89 ACP (ADVANCE CARE PLANNING): Chronic | ICD-10-CM

## 2018-05-03 ENCOUNTER — TRANSFERRED RECORDS (OUTPATIENT)
Dept: HEALTH INFORMATION MANAGEMENT | Facility: HOSPITAL | Age: 73
End: 2018-05-03

## 2018-05-10 RX ORDER — OFLOXACIN 3 MG/ML
1 SOLUTION/ DROPS OPHTHALMIC 4 TIMES DAILY
COMMUNITY
End: 2019-03-12

## 2018-05-10 RX ORDER — PREDNISOLONE ACETATE 10 MG/ML
1 SUSPENSION/ DROPS OPHTHALMIC 4 TIMES DAILY
COMMUNITY
End: 2019-03-12

## 2018-05-10 RX ORDER — KETOROLAC TROMETHAMINE 5 MG/ML
1 SOLUTION OPHTHALMIC 4 TIMES DAILY
COMMUNITY
End: 2019-03-12

## 2018-05-14 ENCOUNTER — TRANSFERRED RECORDS (OUTPATIENT)
Dept: HEALTH INFORMATION MANAGEMENT | Facility: HOSPITAL | Age: 73
End: 2018-05-14

## 2018-05-16 RX ORDER — MULTIVITAMIN,THERAPEUTIC
1 TABLET ORAL DAILY
COMMUNITY

## 2018-05-17 ENCOUNTER — ANESTHESIA (OUTPATIENT)
Dept: SURGERY | Facility: HOSPITAL | Age: 73
End: 2018-05-17
Payer: MEDICARE

## 2018-05-17 ENCOUNTER — ANESTHESIA EVENT (OUTPATIENT)
Dept: SURGERY | Facility: HOSPITAL | Age: 73
End: 2018-05-17
Payer: MEDICARE

## 2018-05-17 ENCOUNTER — HOSPITAL ENCOUNTER (OUTPATIENT)
Facility: HOSPITAL | Age: 73
Discharge: HOME | End: 2018-05-17
Attending: OPHTHALMOLOGY | Admitting: OPHTHALMOLOGY
Payer: MEDICARE

## 2018-05-17 ENCOUNTER — SURGERY (OUTPATIENT)
Age: 73
End: 2018-05-17

## 2018-05-17 VITALS
WEIGHT: 144.4 LBS | DIASTOLIC BLOOD PRESSURE: 89 MMHG | OXYGEN SATURATION: 97 % | HEIGHT: 62 IN | SYSTOLIC BLOOD PRESSURE: 148 MMHG | BODY MASS INDEX: 26.57 KG/M2 | RESPIRATION RATE: 18 BRPM | TEMPERATURE: 97.2 F

## 2018-05-17 PROCEDURE — 27210794 ZZH OR GENERAL SUPPLY STERILE: Performed by: OPHTHALMOLOGY

## 2018-05-17 PROCEDURE — 36000058 ZZH SURGERY LEVEL 3 EA 15 ADDTL MIN: Performed by: OPHTHALMOLOGY

## 2018-05-17 PROCEDURE — 37000008 ZZH ANESTHESIA TECHNICAL FEE, 1ST 30 MIN: Performed by: OPHTHALMOLOGY

## 2018-05-17 PROCEDURE — 37000009 ZZH ANESTHESIA TECHNICAL FEE, EACH ADDTL 15 MIN: Performed by: OPHTHALMOLOGY

## 2018-05-17 PROCEDURE — 25000125 ZZHC RX 250: Performed by: OPHTHALMOLOGY

## 2018-05-17 PROCEDURE — 36000056 ZZH SURGERY LEVEL 3 1ST 30 MIN: Performed by: OPHTHALMOLOGY

## 2018-05-17 PROCEDURE — V2632 POST CHMBR INTRAOCULAR LENS: HCPCS | Performed by: OPHTHALMOLOGY

## 2018-05-17 PROCEDURE — 25000128 H RX IP 250 OP 636: Performed by: OPHTHALMOLOGY

## 2018-05-17 PROCEDURE — 66984 XCAPSL CTRC RMVL W/O ECP: CPT | Performed by: NURSE ANESTHETIST, CERTIFIED REGISTERED

## 2018-05-17 PROCEDURE — 40000306 ZZH STATISTIC PRE PROC ASSESS II: Performed by: OPHTHALMOLOGY

## 2018-05-17 PROCEDURE — 99100 ANES PT EXTEME AGE<1 YR&>70: CPT | Performed by: NURSE ANESTHETIST, CERTIFIED REGISTERED

## 2018-05-17 PROCEDURE — 71000027 ZZH RECOVERY PHASE 2 EACH 15 MINS: Performed by: OPHTHALMOLOGY

## 2018-05-17 DEVICE — LENS-TECNIS PCB00 18.5 PRELOADED: Type: IMPLANTABLE DEVICE | Status: FUNCTIONAL

## 2018-05-17 RX ORDER — CYCLOPENTOLATE HYDROCHLORIDE 10 MG/ML
1 SOLUTION/ DROPS OPHTHALMIC
Status: COMPLETED | OUTPATIENT
Start: 2018-05-17 | End: 2018-05-17

## 2018-05-17 RX ORDER — FENTANYL CITRATE 50 UG/ML
25-50 INJECTION, SOLUTION INTRAMUSCULAR; INTRAVENOUS
Status: DISCONTINUED | OUTPATIENT
Start: 2018-05-17 | End: 2018-05-17 | Stop reason: HOSPADM

## 2018-05-17 RX ORDER — LABETALOL HYDROCHLORIDE 5 MG/ML
10 INJECTION, SOLUTION INTRAVENOUS
Status: DISCONTINUED | OUTPATIENT
Start: 2018-05-17 | End: 2018-05-17 | Stop reason: HOSPADM

## 2018-05-17 RX ORDER — OFLOXACIN 3 MG/ML
SOLUTION/ DROPS OPHTHALMIC PRN
Status: DISCONTINUED | OUTPATIENT
Start: 2018-05-17 | End: 2018-05-17 | Stop reason: HOSPADM

## 2018-05-17 RX ORDER — NALOXONE HYDROCHLORIDE 0.4 MG/ML
.1-.4 INJECTION, SOLUTION INTRAMUSCULAR; INTRAVENOUS; SUBCUTANEOUS
Status: DISCONTINUED | OUTPATIENT
Start: 2018-05-17 | End: 2018-05-17 | Stop reason: HOSPADM

## 2018-05-17 RX ORDER — OFLOXACIN 3 MG/ML
1 SOLUTION/ DROPS OPHTHALMIC ONCE
Status: COMPLETED | OUTPATIENT
Start: 2018-05-17 | End: 2018-05-17

## 2018-05-17 RX ORDER — PROPARACAINE HYDROCHLORIDE 5 MG/ML
1 SOLUTION/ DROPS OPHTHALMIC ONCE
Status: COMPLETED | OUTPATIENT
Start: 2018-05-17 | End: 2018-05-17

## 2018-05-17 RX ORDER — MEPERIDINE HYDROCHLORIDE 25 MG/ML
12.5 INJECTION INTRAMUSCULAR; INTRAVENOUS; SUBCUTANEOUS
Status: DISCONTINUED | OUTPATIENT
Start: 2018-05-17 | End: 2018-05-17 | Stop reason: HOSPADM

## 2018-05-17 RX ORDER — HYDRALAZINE HYDROCHLORIDE 20 MG/ML
2.5-5 INJECTION INTRAMUSCULAR; INTRAVENOUS EVERY 10 MIN PRN
Status: DISCONTINUED | OUTPATIENT
Start: 2018-05-17 | End: 2018-05-17 | Stop reason: HOSPADM

## 2018-05-17 RX ORDER — ONDANSETRON 2 MG/ML
4 INJECTION INTRAMUSCULAR; INTRAVENOUS EVERY 30 MIN PRN
Status: DISCONTINUED | OUTPATIENT
Start: 2018-05-17 | End: 2018-05-17 | Stop reason: HOSPADM

## 2018-05-17 RX ORDER — LIDOCAINE 40 MG/G
CREAM TOPICAL
Status: DISCONTINUED | OUTPATIENT
Start: 2018-05-17 | End: 2018-05-17 | Stop reason: HOSPADM

## 2018-05-17 RX ORDER — ALBUTEROL SULFATE 0.83 MG/ML
2.5 SOLUTION RESPIRATORY (INHALATION) EVERY 4 HOURS PRN
Status: DISCONTINUED | OUTPATIENT
Start: 2018-05-17 | End: 2018-05-17 | Stop reason: HOSPADM

## 2018-05-17 RX ORDER — PREDNISOLONE ACETATE 10 MG/ML
SUSPENSION/ DROPS OPHTHALMIC PRN
Status: DISCONTINUED | OUTPATIENT
Start: 2018-05-17 | End: 2018-05-17 | Stop reason: HOSPADM

## 2018-05-17 RX ORDER — TETRACAINE HYDROCHLORIDE 5 MG/ML
SOLUTION OPHTHALMIC PRN
Status: DISCONTINUED | OUTPATIENT
Start: 2018-05-17 | End: 2018-05-17 | Stop reason: HOSPADM

## 2018-05-17 RX ORDER — SODIUM CHLORIDE, SODIUM LACTATE, POTASSIUM CHLORIDE, CALCIUM CHLORIDE 600; 310; 30; 20 MG/100ML; MG/100ML; MG/100ML; MG/100ML
INJECTION, SOLUTION INTRAVENOUS CONTINUOUS
Status: DISCONTINUED | OUTPATIENT
Start: 2018-05-17 | End: 2018-05-17 | Stop reason: HOSPADM

## 2018-05-17 RX ORDER — ONDANSETRON 4 MG/1
4 TABLET, ORALLY DISINTEGRATING ORAL EVERY 30 MIN PRN
Status: DISCONTINUED | OUTPATIENT
Start: 2018-05-17 | End: 2018-05-17 | Stop reason: HOSPADM

## 2018-05-17 RX ORDER — PHENYLEPHRINE HYDROCHLORIDE 25 MG/ML
1 SOLUTION/ DROPS OPHTHALMIC
Status: COMPLETED | OUTPATIENT
Start: 2018-05-17 | End: 2018-05-17

## 2018-05-17 RX ADMIN — PROPARACAINE HYDROCHLORIDE 1 DROP: 5 SOLUTION/ DROPS OPHTHALMIC at 09:53

## 2018-05-17 RX ADMIN — CYCLOPENTOLATE HYDROCHLORIDE 1 DROP: 10 SOLUTION/ DROPS OPHTHALMIC at 09:54

## 2018-05-17 RX ADMIN — FLURBIPROFEN SODIUM 0.5 ML: 0.3 SOLUTION/ DROPS OPHTHALMIC at 10:01

## 2018-05-17 RX ADMIN — PHENYLEPHRINE HYDROCHLORIDE 1 DROP: 25 SOLUTION/ DROPS OPHTHALMIC at 09:54

## 2018-05-17 RX ADMIN — PHENYLEPHRINE HYDROCHLORIDE 1 DROP: 25 SOLUTION/ DROPS OPHTHALMIC at 10:01

## 2018-05-17 RX ADMIN — CYCLOPENTOLATE HYDROCHLORIDE 1 DROP: 10 SOLUTION/ DROPS OPHTHALMIC at 10:01

## 2018-05-17 RX ADMIN — OFLOXACIN 1 DROP: 3 SOLUTION/ DROPS OPHTHALMIC at 11:52

## 2018-05-17 RX ADMIN — EPINEPHRINE 350 ML: 1 INJECTION, SOLUTION INTRAMUSCULAR; SUBCUTANEOUS at 11:53

## 2018-05-17 RX ADMIN — OFLOXACIN 1 DROP: 3 SOLUTION/ DROPS OPHTHALMIC at 09:54

## 2018-05-17 RX ADMIN — PHENYLEPHRINE HYDROCHLORIDE 2 ML: 25 SOLUTION/ DROPS OPHTHALMIC at 11:51

## 2018-05-17 RX ADMIN — PREDNISOLONE ACETATE 1 DROP: 10 SUSPENSION/ DROPS OPHTHALMIC at 11:52

## 2018-05-17 RX ADMIN — TETRACAINE HYDROCHLORIDE 2 DROP: 5 SOLUTION OPHTHALMIC at 11:52

## 2018-05-17 RX ADMIN — Medication 0.8 ML: at 11:52

## 2018-05-17 RX ADMIN — Medication 0.5 ML: at 11:51

## 2018-05-17 ASSESSMENT — LIFESTYLE VARIABLES: TOBACCO_USE: 1

## 2018-05-17 NOTE — DISCHARGE INSTRUCTIONS
AFTER CATARACT SURGERY RESTRICTIONS    SHIELD: WEAR OVER SURGICAL EYE AT NIGHT FOR 1 WEEK    ACTIVITY/LIFTING: Avoid activities, which increase abdominal/head pressure such as pulling on a starter cord, heavy lifting (over 15-20 lbs) or bending with the head below the waist, for 1 week. Avoid sudden jerky movements (chopping, shoveling) for 1 week. Waking and lower body exercise such as biking is okay.     WATER: Showering/washing hair is okay the day after surgery, but avoid excess water, soap, or shampoo in your eyes. Clean eyelids gently with a clean, wet washcloth as needed. Avoid pressure on the eye.     SUNLIGHT: If the eye is light sensitive after surgery, dark glasses may be worn.     DIET/MEDICATIONS: Resume you usual diet and medications your family doctor ehas prescribed. Continue to use/follow the instructions for your eye drops.     DRIVING: Avoid driving with a patch. Resume driving when you feel safe, but don't drive on the day of surgery.    PAIN: Minor pain and itching is normal during healing. DO NOT RUB THE EYE! Report any unusual swelling, increased discharge or pain that is not relieved by Tylenol (or equivalent). If sudden drop/change in vision call immediately. [facility] [phone]    CONTINUE TO USE ALL THE EYE DROPS PER THE INSTRUCTIONS ORDERED BY DR. SANDERS'S TECHNICIANS    FOR EMERGENCY DR. GUZMÁN: [phone]  FOLLOW EYE DROP INSTRUCTIONS GIVEN BY 's OFFICE

## 2018-05-17 NOTE — IP AVS SNAPSHOT
[facility]    [address] [address]    Phone:  [phone]                                       After Visit Summary   5/17/2018    Vanesa Fleming    MRN: [id number]           After Visit Summary Signature Page     I have received my discharge instructions, and my questions have been answered. I have discussed any challenges I see with this plan with the nurse or doctor.    ..........................................................................................................................................  Patient/Patient Representative Signature      ..........................................................................................................................................  Patient Representative Print Name and Relationship to Patient    ..................................................               ................................................  Date                                            Time    ..........................................................................................................................................  Reviewed by Signature/Title    ...................................................              ..............................................  Date                                                            Time

## 2018-05-17 NOTE — IP AVS SNAPSHOT
MRN:[id number]                      After Visit Summary   5/17/2018    Vanesa Fleming    MRN: [id number]           Thank you!     Thank you for choosing [facility] for your care. Our goal is always to provide you with excellent care. Hearing back from our patients is one way we can continue to improve our services. Please take a few minutes to complete the written survey that you may receive in the mail after you visit with us. Thank you!        Patient Information     Date Of Birth          1945        About your hospital stay     You were admitted on:  May 17, 2018 You last received care in the:  [facility] Preop/Phase II    You were discharged on:  May 17, 2018       Who to Call     For medical emergencies, please call 911.  For non-urgent questions about your medical care, please call your primary care provider or clinic, [phone]  For questions related to your surgery, please call your surgery clinic        Attending Provider     Provider Andrews Byrne MD Ophthalmology       Primary Care Provider Office Phone # Fax #    Felipa Cool [phone]      Further instructions from your care team       AFTER CATARACT SURGERY RESTRICTIONS    SHIELD: WEAR OVER SURGICAL EYE AT NIGHT FOR 1 WEEK    ACTIVITY/LIFTING: Avoid activities, which increase abdominal/head pressure such as pulling on a starter cord, heavy lifting (over 15-20 lbs) or bending with the head below the waist, for 1 week. Avoid sudden jerky movements (chopping, shoveling) for 1 week. Waking and lower body exercise such as biking is okay.     WATER: Showering/washing hair is okay the day after surgery, but avoid excess water, soap, or shampoo in your eyes. Clean eyelids gently with a clean, wet washcloth as needed. Avoid pressure on the eye.     SUNLIGHT: If the eye is light sensitive after surgery, dark glasses may be worn.     DIET/MEDICATIONS: Resume you usual diet and medications your  "family doctor ehas prescribed. Continue to use/follow the instructions for your eye drops.     DRIVING: Avoid driving with a patch. Resume driving when you feel safe, but don't drive on the day of surgery.    PAIN: Minor pain and itching is normal during healing. DO NOT RUB THE EYE! Report any unusual swelling, increased discharge or pain that is not relieved by Tylenol (or equivalent). If sudden drop/change in vision call immediately. [facility] [phone]    CONTINUE TO USE ALL THE EYE DROPS PER THE INSTRUCTIONS ORDERED BY DR. SANDERS'S TECHNICIANS    FOR EMERGENCY DR. OGLESBY: [phone]  FOLLOW EYE DROP INSTRUCTIONS GIVEN BY 's OFFICE                Pending Results     No orders found from 5/15/2018 to 5/18/2018.            Admission Information     Date & Time Provider Department Dept. Phone    5/17/2018 Andrews Oglesby MD [facility] Preop/Phase [phone]      Your Vitals Were     Blood Pressure Temperature Respirations Height Weight Pulse Oximetry    141/110 98  F (36.7  C) (Oral) 18 1.575 m (5' 2\") 65.5 kg (144 lb 6.4 oz) 80%    BMI (Body Mass Index)                   26.41 kg/m2           MyChart Information     [email or web address] gives you secure access to your electronic health record. If you see a primary care provider, you can also send messages to your care team and make appointments. If you have questions, please call your primary care clinic.  If you do not have a primary care provider, please call [phone] and they will assist you.        Care EveryWhere ID     This is your Care EveryWhere ID. This could be used by other organizations to access your [place] medical records  [id number]        Equal Access to Services     [facility]: Hadii jolanta lopez Sopancho, waaxda luqadaha, qaybta kaalmada vern ingram. So [facility] [phone].    ATENCIÓN: Si habla español, tiene a sahni disposición servicios gratuitos de asistencia lingüística. Llame al " [phone].    We comply with applicable federal civil rights laws and [place] laws. We do not discriminate on the basis of race, color, national origin, age, disability, sex, sexual orientation, or gender identity.               Review of your medicines      CONTINUE these medicines which have NOT CHANGED        Dose / Directions    ALLOPURINOL PO        Dose:  300 mg   Take 300 mg by mouth daily   Refills:  0       aspirin 325 MG tablet   Used for:  Cerebrovascular accident (CVA) due to thrombosis of middle cerebral artery, unspecified blood vessel laterality (H)        Dose:  325 mg   Take 1 tablet (325 mg) by mouth daily   Refills:  0       atorvastatin 80 MG tablet   Commonly known as:  LIPITOR   Used for:  Elevated LDL cholesterol level        Dose:  80 mg   Take 1 tablet (80 mg) by mouth daily   Quantity:  90 tablet   Refills:  3       BUSPIRONE HCL PO        Dose:  5 mg   Take 5 mg by mouth Take 7.5 mg daily   Refills:  0       FISH OIL OMEGA-3 PO        Dose:  1000 mg   Take 1,000 mg by mouth daily   Refills:  0       ketorolac 0.5 % ophthalmic solution   Commonly known as:  ACULAR        Dose:  1 drop   Place 1 drop into the right eye 4 times daily With taper.   Refills:  0       LASIX 40 MG tablet   Generic drug:  furosemide        Dose:  40 mg   Take 40 mg by mouth 2 times daily   Refills:  0       LEVOTHYROXINE SODIUM PO        Dose:  50 mcg   Take 50 mcg by mouth daily   Refills:  0       MECLIZINE HCL PO        Dose:  25 mg   Take 25 mg by mouth 3 times daily as needed for dizziness   Refills:  0       MELATONIN PO        Dose:  3 mg   Take 3 mg by mouth nightly as needed Take 2 tablets at bedtime as needed with food.   Refills:  0       METOPROLOL SUCCINATE ER PO        Dose:  50 mg   Take 50 mg by mouth daily   Refills:  0       multivitamin, therapeutic Tabs tablet        Dose:  1 tablet   Take 1 tablet by mouth daily   Refills:  0       ofloxacin 0.3 % ophthalmic solution   Commonly known as:   OCUFLOX        Dose:  1 drop   Place 1 drop into the right eye 4 times daily   Refills:  0       prednisoLONE acetate 1 % ophthalmic susp   Commonly known as:  PRED FORTE        Dose:  1 drop   Place 1 drop into the right eye 4 times daily With taper.   Refills:  0       SERTRALINE HCL PO        Dose:  50 mg   Take 50 mg by mouth daily   Refills:  0                Protect others around you: Learn how to safely use, store and throw away your medicines at www.disposemymeds.org.             Medication List: This is a list of all your medications and when to take them. Check marks below indicate your daily home schedule. Keep this list as a reference.      Medications           Morning Afternoon Evening Bedtime As Needed    ALLOPURINOL PO   Take 300 mg by mouth daily                                aspirin 325 MG tablet   Take 1 tablet (325 mg) by mouth daily                                atorvastatin 80 MG tablet   Commonly known as:  LIPITOR   Take 1 tablet (80 mg) by mouth daily                                BUSPIRONE HCL PO   Take 5 mg by mouth Take 7.5 mg daily                                FISH OIL OMEGA-3 PO   Take 1,000 mg by mouth daily                                ketorolac 0.5 % ophthalmic solution   Commonly known as:  ACULAR   Place 1 drop into the right eye 4 times daily With taper.                                LASIX 40 MG tablet   Take 40 mg by mouth 2 times daily   Generic drug:  furosemide                                LEVOTHYROXINE SODIUM PO   Take 50 mcg by mouth daily                                MECLIZINE HCL PO   Take 25 mg by mouth 3 times daily as needed for dizziness                                MELATONIN PO   Take 3 mg by mouth nightly as needed Take 2 tablets at bedtime as needed with food.                                METOPROLOL SUCCINATE ER PO   Take 50 mg by mouth daily                                multivitamin, therapeutic Tabs tablet   Take 1 tablet by mouth daily                                 ofloxacin 0.3 % ophthalmic solution   Commonly known as:  OCUFLOX   Place 1 drop into the right eye 4 times daily   Last time this was given:  1 drop on 5/17/2018 11:52 AM                                prednisoLONE acetate 1 % ophthalmic susp   Commonly known as:  PRED FORTE   Place 1 drop into the right eye 4 times daily With taper.   Last time this was given:  1 drop on 5/17/2018 11:52 AM                                SERTRALINE HCL PO   Take 50 mg by mouth daily

## 2018-05-17 NOTE — ANESTHESIA PREPROCEDURE EVALUATION
Anesthesia Evaluation     . Pt has had prior anesthetic.     No history of anesthetic complications          ROS/MED HX    ENT/Pulmonary:     (+)tobacco use, Past use , . .    Neurologic:     (+)delerium CVA date: 1/24/17 without deficits    Cardiovascular:     (+) Dyslipidemia, hypertension-range: taking beta blocker, -CAD, --. : . . . :. . Previous cardiac testing Echodate:1/23/17results:1.  Mild left ventricular enlargement.  Normal systolic function.  Ejection fraction at 55%.  2.  Mild left atrial enlargement.  3.  Normal right ventricular size and function.  4.  Mild-to-moderate mitral regurgitation.  5.  Normal aortic valve.  6.  Mild-to-moderate tricuspid regurgitation.  Peak systolic pressure  of the right ventricle is estimated at 28 plus right atrium.  7.  Evidence of diastolic dysfunction.date: results:ECG reviewed date:5/14/18 results:SB, long QT interval date: results:          METS/Exercise Tolerance:  >4 METS   Hematologic:  - neg hematologic  ROS       Musculoskeletal:   (+) arthritis, , , other musculoskeletal- gout      GI/Hepatic:  - neg GI/hepatic ROS       Renal/Genitourinary:  - ROS Renal section negative       Endo:     (+) thyroid problem hypothyroidism, Obesity, .      Psychiatric:     (+) psychiatric history anxiety      Infectious Disease:  - neg infectious disease ROS       Malignancy:   (+) Malignancy History of Breast  Breast CA status post Surgery, Chemo and Radiation.         Other:    - neg other ROS                 Physical Exam  Normal systems: cardiovascular and pulmonary    Airway   Mallampati: IV  TM distance: >3 FB  Neck ROM: full    Dental   (+) upper dentures and lower dentures    Cardiovascular   Rhythm and rate: regular and normal      Pulmonary    breath sounds clear to auscultation                    Anesthesia Plan      History & Physical Review  History and physical reviewed and following examination; no interval change.    ASA Status:  3 .    NPO Status:  > 8  hours    Plan for Other with Other induction. Maintenance will be Other.    PONV prophylaxis:  Ondansetron (or other 5HT-3)  Risks and benefits of MAC anesthetic discussed including dental damage, aspiration, loss of airway, conversion to general anesthetic, CV complications, MI, stroke, death. Pt wishes to proceed.       Postoperative Care  Postoperative pain management:  IV analgesics.      Consents  Anesthetic plan, risks, benefits and alternatives discussed with:  Patient..                          .

## 2018-05-17 NOTE — ANESTHESIA POSTPROCEDURE EVALUATION
Patient: Vanesa Fleming    Procedure(s):  CATARACT EXTRACTION RIGHT EYE WITH IMPLANT - Wound Class: I-Clean    Diagnosis:CATARACT RIGHT EYE  Diagnosis Additional Information: No value filed.    Anesthesia Type:  Other    Note:  Anesthesia Post Evaluation    Patient location during evaluation: Bedside  Patient participation: Able to fully participate in evaluation  Level of consciousness: awake and alert  Pain management: adequate  Airway patency: patent  Cardiovascular status: acceptable and stable  Respiratory status: room air  Hydration status: acceptable  PONV: none     Anesthetic complications: None          Last vitals:  Vitals:    05/17/18 1215 05/17/18 1220 05/17/18 1225   BP: 146/68 148/78 148/89   Resp: 18 18 18   Temp:   97.2  F (36.2  C)   SpO2: 98% 98% 97%         Electronically Signed By: JESSE Del Castillo CRNA  May 17, 2018  1:48 PM

## 2018-05-17 NOTE — ANESTHESIA CARE TRANSFER NOTE
Patient: Vanesa Fleming    Procedure(s):  CATARACT EXTRACTION RIGHT EYE WITH IMPLANT - Wound Class: I-Clean    Diagnosis: CATARACT RIGHT EYE  Diagnosis Additional Information: No value filed.    Anesthesia Type:   Other     Note:  Airway :Room Air  Patient transferred to:Phase II  Handoff Report: Identifed the Patient, Identified the Reponsible Provider, Reviewed the pertinent medical history, Discussed the surgical course, Reviewed Intra-OP anesthesia mangement and issues during anesthesia, Set expectations for post-procedure period and Allowed opportunity for questions and acknowledgement of understanding      Vitals: (Last set prior to Anesthesia Care Transfer)    CRNA VITALS  5/17/2018 1134 - 5/17/2018 1234      5/17/2018             Resp Rate (set): 8                Electronically Signed By: JESSE Del Castillo CRNA  May 17, 2018  12:34 PM

## 2018-05-17 NOTE — OR NURSING
Took juice and cookies w/o nausea.Patient and responsible adult given discharge instructions with no questions regarding instructions. Isai score 20. Pain level 010.  Discharged from unit via walking. Patient discharged to home.

## 2018-06-05 ENCOUNTER — ANESTHESIA EVENT (OUTPATIENT)
Dept: SURGERY | Facility: HOSPITAL | Age: 73
End: 2018-06-05
Payer: MEDICARE

## 2018-06-05 RX ORDER — FENTANYL CITRATE 50 UG/ML
25-50 INJECTION, SOLUTION INTRAMUSCULAR; INTRAVENOUS
Status: CANCELLED | OUTPATIENT
Start: 2018-06-05

## 2018-06-05 RX ORDER — SODIUM CHLORIDE, SODIUM LACTATE, POTASSIUM CHLORIDE, CALCIUM CHLORIDE 600; 310; 30; 20 MG/100ML; MG/100ML; MG/100ML; MG/100ML
INJECTION, SOLUTION INTRAVENOUS CONTINUOUS
Status: CANCELLED | OUTPATIENT
Start: 2018-06-05

## 2018-06-05 RX ORDER — ONDANSETRON 4 MG/1
4 TABLET, ORALLY DISINTEGRATING ORAL EVERY 30 MIN PRN
Status: CANCELLED | OUTPATIENT
Start: 2018-06-05

## 2018-06-05 RX ORDER — NALOXONE HYDROCHLORIDE 0.4 MG/ML
.1-.4 INJECTION, SOLUTION INTRAMUSCULAR; INTRAVENOUS; SUBCUTANEOUS
Status: CANCELLED | OUTPATIENT
Start: 2018-06-05 | End: 2018-06-06

## 2018-06-05 RX ORDER — MEPERIDINE HYDROCHLORIDE 50 MG/ML
12.5 INJECTION INTRAMUSCULAR; INTRAVENOUS; SUBCUTANEOUS
Status: CANCELLED | OUTPATIENT
Start: 2018-06-05

## 2018-06-05 RX ORDER — ONDANSETRON 2 MG/ML
4 INJECTION INTRAMUSCULAR; INTRAVENOUS EVERY 30 MIN PRN
Status: CANCELLED | OUTPATIENT
Start: 2018-06-05

## 2018-06-05 NOTE — ANESTHESIA PREPROCEDURE EVALUATION
Anesthesia Evaluation     . Pt has had prior anesthetic. Type: MAC    No history of anesthetic complications          ROS/MED HX    ENT/Pulmonary:  - neg pulmonary ROS     Neurologic:     (+)CVA date: 2017 TIA date: 2017    Seizures: 2017.   Cardiovascular:     (+) Dyslipidemia, hypertension-range: 130-155 systolic, ---. : . . . :. .       METS/Exercise Tolerance:     Hematologic:  - neg hematologic  ROS       Musculoskeletal:  - neg musculoskeletal ROS       GI/Hepatic:  - neg GI/hepatic ROS   (+) liver disease,       Renal/Genitourinary:  - ROS Renal section negative       Endo:     (+) thyroid problem hypothyroidism, Obesity, .      Psychiatric:     (+) psychiatric history anxiety and depression      Infectious Disease:  - neg infectious disease ROS       Malignancy:      - no malignancy   Other:    - neg other ROS                 Physical Exam      Airway   Mallampati: II  TM distance: >3 FB  Neck ROM: full    Dental   (+) upper dentures and lower dentures    Cardiovascular   Rhythm and rate: regular and normal      Pulmonary    breath sounds clear to auscultation                    Anesthesia Plan      History & Physical Review  History and physical reviewed and following examination; no interval change.    ASA Status:  3 .    NPO Status:  > 8 hours    Plan for MAC with Intravenous and Propofol induction. Reason for MAC:  Difficulty with conscious sedation (QS)  PONV prophylaxis:  Ondansetron (or other 5HT-3)       Postoperative Care  Postoperative pain management:  IV analgesics.      Consents  Anesthetic plan, risks, benefits and alternatives discussed with:  Patient and Sibling..                          .

## 2018-06-07 ENCOUNTER — SURGERY (OUTPATIENT)
Age: 73
End: 2018-06-07

## 2018-06-07 ENCOUNTER — ANESTHESIA (OUTPATIENT)
Dept: SURGERY | Facility: HOSPITAL | Age: 73
End: 2018-06-07
Payer: MEDICARE

## 2018-06-07 ENCOUNTER — HOSPITAL ENCOUNTER (OUTPATIENT)
Facility: HOSPITAL | Age: 73
Discharge: HOME | End: 2018-06-07
Attending: OPHTHALMOLOGY | Admitting: OPHTHALMOLOGY
Payer: MEDICARE

## 2018-06-07 VITALS
RESPIRATION RATE: 18 BRPM | WEIGHT: 145.2 LBS | TEMPERATURE: 97.8 F | OXYGEN SATURATION: 98 % | DIASTOLIC BLOOD PRESSURE: 91 MMHG | HEIGHT: 64 IN | SYSTOLIC BLOOD PRESSURE: 163 MMHG | BODY MASS INDEX: 24.79 KG/M2

## 2018-06-07 PROCEDURE — 25000125 ZZHC RX 250: Performed by: OPHTHALMOLOGY

## 2018-06-07 PROCEDURE — 66984 XCAPSL CTRC RMVL W/O ECP: CPT | Performed by: ANESTHESIOLOGY

## 2018-06-07 PROCEDURE — 37000009 ZZH ANESTHESIA TECHNICAL FEE, EACH ADDTL 15 MIN: Performed by: OPHTHALMOLOGY

## 2018-06-07 PROCEDURE — 99100 ANES PT EXTEME AGE<1 YR&>70: CPT | Performed by: NURSE ANESTHETIST, CERTIFIED REGISTERED

## 2018-06-07 PROCEDURE — 66984 XCAPSL CTRC RMVL W/O ECP: CPT | Performed by: NURSE ANESTHETIST, CERTIFIED REGISTERED

## 2018-06-07 PROCEDURE — 36000056 ZZH SURGERY LEVEL 3 1ST 30 MIN: Performed by: OPHTHALMOLOGY

## 2018-06-07 PROCEDURE — V2632 POST CHMBR INTRAOCULAR LENS: HCPCS | Performed by: OPHTHALMOLOGY

## 2018-06-07 PROCEDURE — 37000008 ZZH ANESTHESIA TECHNICAL FEE, 1ST 30 MIN: Performed by: OPHTHALMOLOGY

## 2018-06-07 PROCEDURE — 40000305 ZZH STATISTIC PRE PROC ASSESS I: Performed by: OPHTHALMOLOGY

## 2018-06-07 PROCEDURE — 25000128 H RX IP 250 OP 636: Performed by: OPHTHALMOLOGY

## 2018-06-07 PROCEDURE — 27210794 ZZH OR GENERAL SUPPLY STERILE: Performed by: OPHTHALMOLOGY

## 2018-06-07 PROCEDURE — 71000027 ZZH RECOVERY PHASE 2 EACH 15 MINS: Performed by: OPHTHALMOLOGY

## 2018-06-07 DEVICE — LENS-TECNIS PCB00 18.5 PRELOADED: Type: IMPLANTABLE DEVICE | Site: EYE | Status: FUNCTIONAL

## 2018-06-07 RX ORDER — OFLOXACIN 3 MG/ML
SOLUTION/ DROPS OPHTHALMIC PRN
Status: DISCONTINUED | OUTPATIENT
Start: 2018-06-07 | End: 2018-06-07 | Stop reason: HOSPADM

## 2018-06-07 RX ORDER — OFLOXACIN 3 MG/ML
1 SOLUTION/ DROPS OPHTHALMIC ONCE
Status: COMPLETED | OUTPATIENT
Start: 2018-06-07 | End: 2018-06-07

## 2018-06-07 RX ORDER — SODIUM CHLORIDE, SODIUM LACTATE, POTASSIUM CHLORIDE, CALCIUM CHLORIDE 600; 310; 30; 20 MG/100ML; MG/100ML; MG/100ML; MG/100ML
INJECTION, SOLUTION INTRAVENOUS CONTINUOUS
Status: DISCONTINUED | OUTPATIENT
Start: 2018-06-07 | End: 2018-06-07 | Stop reason: HOSPADM

## 2018-06-07 RX ORDER — PHENYLEPHRINE HYDROCHLORIDE 25 MG/ML
1 SOLUTION/ DROPS OPHTHALMIC
Status: COMPLETED | OUTPATIENT
Start: 2018-06-07 | End: 2018-06-07

## 2018-06-07 RX ORDER — PREDNISOLONE ACETATE 10 MG/ML
SUSPENSION/ DROPS OPHTHALMIC PRN
Status: DISCONTINUED | OUTPATIENT
Start: 2018-06-07 | End: 2018-06-07 | Stop reason: HOSPADM

## 2018-06-07 RX ORDER — TETRACAINE HYDROCHLORIDE 5 MG/ML
SOLUTION OPHTHALMIC PRN
Status: DISCONTINUED | OUTPATIENT
Start: 2018-06-07 | End: 2018-06-07 | Stop reason: HOSPADM

## 2018-06-07 RX ORDER — CYCLOPENTOLATE HYDROCHLORIDE 10 MG/ML
1 SOLUTION/ DROPS OPHTHALMIC
Status: COMPLETED | OUTPATIENT
Start: 2018-06-07 | End: 2018-06-07

## 2018-06-07 RX ORDER — PROPARACAINE HYDROCHLORIDE 5 MG/ML
1 SOLUTION/ DROPS OPHTHALMIC ONCE
Status: COMPLETED | OUTPATIENT
Start: 2018-06-07 | End: 2018-06-07

## 2018-06-07 RX ADMIN — Medication 0.5 ML: at 11:10

## 2018-06-07 RX ADMIN — CYCLOPENTOLATE HYDROCHLORIDE 1 DROP: 10 SOLUTION/ DROPS OPHTHALMIC at 09:03

## 2018-06-07 RX ADMIN — OFLOXACIN 1 DROP: 3 SOLUTION/ DROPS OPHTHALMIC at 11:09

## 2018-06-07 RX ADMIN — Medication 0.8 ML: at 11:09

## 2018-06-07 RX ADMIN — PHENYLEPHRINE HYDROCHLORIDE 1 DROP: 25 SOLUTION/ DROPS OPHTHALMIC at 09:03

## 2018-06-07 RX ADMIN — PHENYLEPHRINE HYDROCHLORIDE 1 DROP: 25 SOLUTION/ DROPS OPHTHALMIC at 09:11

## 2018-06-07 RX ADMIN — FLURBIPROFEN SODIUM 0.5 ML: 0.3 SOLUTION/ DROPS OPHTHALMIC at 09:11

## 2018-06-07 RX ADMIN — PREDNISOLONE ACETATE 1 DROP: 10 SUSPENSION/ DROPS OPHTHALMIC at 11:09

## 2018-06-07 RX ADMIN — TETRACAINE HYDROCHLORIDE 2 DROP: 5 SOLUTION OPHTHALMIC at 11:10

## 2018-06-07 RX ADMIN — OFLOXACIN 1 DROP: 3 SOLUTION/ DROPS OPHTHALMIC at 09:03

## 2018-06-07 RX ADMIN — CYCLOPENTOLATE HYDROCHLORIDE 1 DROP: 10 SOLUTION/ DROPS OPHTHALMIC at 09:11

## 2018-06-07 RX ADMIN — PROPARACAINE HYDROCHLORIDE 1 DROP: 5 SOLUTION/ DROPS OPHTHALMIC at 09:03

## 2018-06-07 RX ADMIN — EPINEPHRINE 350 ML: 1 INJECTION, SOLUTION INTRAMUSCULAR; SUBCUTANEOUS at 11:08

## 2018-06-07 NOTE — IP AVS SNAPSHOT
MRN:[id number]                      After Visit Summary   6/7/2018    Vanesa Fleming    MRN: [id number]           Thank you!     Thank you for choosing [facility] for your care. Our goal is always to provide you with excellent care. Hearing back from our patients is one way we can continue to improve our services. Please take a few minutes to complete the written survey that you may receive in the mail after you visit with us. Thank you!        Patient Information     Date Of Birth          1945        About your hospital stay     You were admitted on:  June 7, 2018 You last received care in the:  [facility] Main Operating Room    You were discharged on:  June 7, 2018       Who to Call     For medical emergencies, please call 911.  For non-urgent questions about your medical care, please call your primary care provider or clinic, [phone]  For questions related to your surgery, please call your surgery clinic        Attending Provider     Provider Andrews Byrne MD Ophthalmology       Primary Care Provider Office Phone # Fax #    Felipa JOSELYN Cool [phone]      Further instructions from your care team       AFTER CATARACT SURGERY RESTRICTIONS    SHIELD: WEAR OVER SURGICAL EYE AT NIGHT FOR 1 WEEK    ACTIVITY/LIFTING: Avoid activities, which increase abdominal/head pressure such as pulling on a starter cord, heavy lifting (over 15-20 lbs) or bending with the head below the waist, for 1 week. Avoid sudden jerky movements (chopping, shoveling) for 1 week. Waking and lower body exercise such as biking is okay.     WATER: Showering/washing hair is okay the day after surgery, but avoid excess water, soap, or shampoo in your eyes. Clean eyelids gently with a clean, wet washcloth as needed. Avoid pressure on the eye.     SUNLIGHT: If the eye is light sensitive after surgery, dark glasses may be worn.     DIET/MEDICATIONS: Resume you usual diet and medications your  "family doctor ehas prescribed. Continue to use/follow the instructions for your eye drops.     DRIVING: Avoid driving with a patch. Resume driving when you feel safe, but don't drive on the day of surgery.    PAIN: Minor pain and itching is normal during healing. DO NOT RUB THE EYE! Report any unusual swelling, increased discharge or pain that is not relieved by Tylenol (or equivalent). If sudden drop/change in vision call immediately. [facility] [phone]    CONTINUE TO USE ALL THE EYE DROPS PER THE INSTRUCTIONS ORDERED BY DR. SANDERS'S TECHNICIANS    FOR EMERGENCY DR. OGLESBY: [phone]    FOLLOW EYE DROP INSTRUCTIONS GIVEN BY 's OFFICE      Pending Results     No orders found from 6/5/2018 to 6/8/2018.            Admission Information     Date & Time Provider Department Dept. Phone    6/7/2018 Andrews Oglesby MD [facility] Main Operating Room [phone]      Your Vitals Were     Blood Pressure Temperature Respirations Height Weight Pulse Oximetry    152/84 97.8  F (36.6  C) (Oral) 18 1.626 m (5' 4\") 65.9 kg (145 lb 3.2 oz) 93%    BMI (Body Mass Index)                   24.92 kg/m2           MyChart Information     [employer] gives you secure access to your electronic health record. If you see a primary care provider, you can also send messages to your care team and make appointments. If you have questions, please call your primary care clinic.  If you do not have a primary care provider, please call [phone] and they will assist you.        Care EveryWhere ID     This is your Care EveryWhere ID. This could be used by other organizations to access your [place] medical records  [id number]        Equal Access to Services     [facility]: Hadii jolanta lopez Sopancho, waaxda luqadaha, qaybta kaalmada vern ingram. So [facility] [phone].    ATENCIÓN: Si habla español, tiene a ashni disposición servicios gratuitos de asistencia lingüística. Llame al " [phone].    We comply with applicable federal civil rights laws and [place] laws. We do not discriminate on the basis of race, color, national origin, age, disability, sex, sexual orientation, or gender identity.               Review of your medicines      CONTINUE these medicines which have NOT CHANGED        Dose / Directions    ALLOPURINOL PO        Dose:  300 mg   Take 300 mg by mouth daily   Refills:  0       aspirin 325 MG tablet   Used for:  Cerebrovascular accident (CVA) due to thrombosis of middle cerebral artery, unspecified blood vessel laterality (H)        Dose:  325 mg   Take 1 tablet (325 mg) by mouth daily   Refills:  0       atorvastatin 80 MG tablet   Commonly known as:  LIPITOR   Used for:  Elevated LDL cholesterol level        Dose:  80 mg   Take 1 tablet (80 mg) by mouth daily   Quantity:  90 tablet   Refills:  3       BUSPIRONE HCL PO        Dose:  5 mg   Take 5 mg by mouth Take 7.5 mg daily   Refills:  0       FISH OIL OMEGA-3 PO        Dose:  1000 mg   Take 1,000 mg by mouth daily   Refills:  0       ketorolac 0.5 % ophthalmic solution   Commonly known as:  ACULAR        Dose:  1 drop   Place 1 drop into the right eye 4 times daily With taper.   Refills:  0       LASIX 40 MG tablet   Generic drug:  furosemide        Dose:  40 mg   Take 40 mg by mouth 2 times daily   Refills:  0       LEVOTHYROXINE SODIUM PO        Dose:  50 mcg   Take 50 mcg by mouth daily   Refills:  0       MECLIZINE HCL PO        Dose:  25 mg   Take 25 mg by mouth 3 times daily as needed for dizziness   Refills:  0       MELATONIN PO        Dose:  3 mg   Take 3 mg by mouth nightly as needed Take 2 tablets at bedtime as needed with food.   Refills:  0       METOPROLOL SUCCINATE ER PO        Dose:  50 mg   Take 50 mg by mouth daily   Refills:  0       multivitamin, therapeutic Tabs tablet        Dose:  1 tablet   Take 1 tablet by mouth daily   Refills:  0       ofloxacin 0.3 % ophthalmic solution   Commonly known as:   OCUFLOX        Dose:  1 drop   Place 1 drop into the right eye 4 times daily   Refills:  0       prednisoLONE acetate 1 % ophthalmic susp   Commonly known as:  PRED FORTE        Dose:  1 drop   Place 1 drop into the right eye 4 times daily With taper.   Refills:  0       SERTRALINE HCL PO        Dose:  50 mg   Take 50 mg by mouth daily   Refills:  0                Protect others around you: Learn how to safely use, store and throw away your medicines at www.disposemymeds.org.             Medication List: This is a list of all your medications and when to take them. Check marks below indicate your daily home schedule. Keep this list as a reference.      Medications           Morning Afternoon Evening Bedtime As Needed    ALLOPURINOL PO   Take 300 mg by mouth daily                                aspirin 325 MG tablet   Take 1 tablet (325 mg) by mouth daily                                atorvastatin 80 MG tablet   Commonly known as:  LIPITOR   Take 1 tablet (80 mg) by mouth daily                                BUSPIRONE HCL PO   Take 5 mg by mouth Take 7.5 mg daily                                FISH OIL OMEGA-3 PO   Take 1,000 mg by mouth daily                                ketorolac 0.5 % ophthalmic solution   Commonly known as:  ACULAR   Place 1 drop into the right eye 4 times daily With taper.                                LASIX 40 MG tablet   Take 40 mg by mouth 2 times daily   Generic drug:  furosemide                                LEVOTHYROXINE SODIUM PO   Take 50 mcg by mouth daily                                MECLIZINE HCL PO   Take 25 mg by mouth 3 times daily as needed for dizziness                                MELATONIN PO   Take 3 mg by mouth nightly as needed Take 2 tablets at bedtime as needed with food.                                METOPROLOL SUCCINATE ER PO   Take 50 mg by mouth daily                                multivitamin, therapeutic Tabs tablet   Take 1 tablet by mouth daily                                 ofloxacin 0.3 % ophthalmic solution   Commonly known as:  OCUFLOX   Place 1 drop into the right eye 4 times daily   Last time this was given:  1 drop on 6/7/2018 11:09 AM                                prednisoLONE acetate 1 % ophthalmic susp   Commonly known as:  PRED FORTE   Place 1 drop into the right eye 4 times daily With taper.   Last time this was given:  1 drop on 6/7/2018 11:09 AM                                SERTRALINE HCL PO   Take 50 mg by mouth daily

## 2018-06-07 NOTE — IP AVS SNAPSHOT
[facility] Operating Room    [address] [place] [address]    Phone:  [phone]                                       After Visit Summary   6/7/2018    Vanesa Fleming    MRN: [id number]           After Visit Summary Signature Page     I have received my discharge instructions, and my questions have been answered. I have discussed any challenges I see with this plan with the nurse or doctor.    ..........................................................................................................................................  Patient/Patient Representative Signature      ..........................................................................................................................................  Patient Representative Print Name and Relationship to Patient    ..................................................               ................................................  Date                                            Time    ..........................................................................................................................................  Reviewed by Signature/Title    ...................................................              ..............................................  Date                                                            Time

## 2018-06-07 NOTE — DISCHARGE INSTRUCTIONS
AFTER CATARACT SURGERY RESTRICTIONS    SHIELD: WEAR OVER SURGICAL EYE AT NIGHT FOR 1 WEEK    ACTIVITY/LIFTING: Avoid activities, which increase abdominal/head pressure such as pulling on a starter cord, heavy lifting (over 15-20 lbs) or bending with the head below the waist, for 1 week. Avoid sudden jerky movements (chopping, shoveling) for 1 week. Waking and lower body exercise such as biking is okay.     WATER: Showering/washing hair is okay the day after surgery, but avoid excess water, soap, or shampoo in your eyes. Clean eyelids gently with a clean, wet washcloth as needed. Avoid pressure on the eye.     SUNLIGHT: If the eye is light sensitive after surgery, dark glasses may be worn.     DIET/MEDICATIONS: Resume you usual diet and medications your family doctor ehas prescribed. Continue to use/follow the instructions for your eye drops.     DRIVING: Avoid driving with a patch. Resume driving when you feel safe, but don't drive on the day of surgery.    PAIN: Minor pain and itching is normal during healing. DO NOT RUB THE EYE! Report any unusual swelling, increased discharge or pain that is not relieved by Tylenol (or equivalent). If sudden drop/change in vision call immediately. [facility] [phone]    CONTINUE TO USE ALL THE EYE DROPS PER THE INSTRUCTIONS ORDERED BY DR. SANDERS[employer]S [employer]ANS    FOR EMERGENCY DR. GUZMÁN: [phone]    FOLLOW EYE DROP INSTRUCTIONS GIVEN BY 's OFFICE

## 2018-06-07 NOTE — OP NOTE
DATE OF SERVICE: 6/7/18      PREOPERATIVE DIAGNOSIS:     Cataract, left eye.       POSTOPERATIVE DIAGNOSIS:  Cataract, left eye.       PROCEDURE:  Phacoemulsification with intraocular lens implant, Model PCB00, 18.5 diopter power.       ANESTHESIA:  Topical with IV sedation.         DESCRIPTION OF PROCEDURE:   Operative risks, benefits and alternatives to the procedure were discussed at length with the patient including loss of vision, loss of the eye.  Patient voiced understanding and informed consent was signed.  The patient was taken to the operating suite, where an appropriate level of anesthesia was given.  Attention was placed to the left eye.  The patient was then prepped and draped in the usual sterile ophthalmic manner.  A lid speculum was placed in the eye to provide exposure and MVR blade was used to make a paracentesis.  Once this was performed, Shugarcaine was then placed intracamerally.  This was then followed by intracameral Viscoat.  A 2.75 mm blade was then used to make a biplanar temporal clear corneal incision.  A cystotome and uttrata were used to make a continuous curvilinear capsulorrhexis.  BSS on Lopez cannula was then used to hydrodissect the lens.  Phacoemulsification was then performed in a divide-and-conquer technique to remove all pieces of the nucleus.  Irrigation aspiration was then used to remove all remaining cortical material and debris.  Amvisc was then used to fill the capsular bag.  A PCB00, 18.5 diopter lens was then injected into the capsular bag using the injector.  Once this was performed, a Goldberg secondary instrument was used to rotate the lens into proper position.  Irrigation aspiration was then used to remove all remaining viscoelastic material and center the lens appropriately.  BSS on 30 gauge cannula was then used to hydrate both wounds.  A Weck-Eunice was used to assess intraocular IOP.  Once this was stable and the lens was found to be in good position, the patient  was undraped.  The patient was brought to the recovery area in stable condition.  Family was made aware of the patient's condition and the patient will follow up with us later this afternoon.         COMPLICATIONS:  No complications.         Andrews Oglesby MD

## 2018-06-07 NOTE — ANESTHESIA CARE TRANSFER NOTE
Patient: Vanesa Fleming    Procedure(s):  CATARACT EXTRACTION LEFT EYE WITH IMPLANT - Wound Class: I-Clean    Diagnosis: CATARACT LEFT EYE  Diagnosis Additional Information: No value filed.    Anesthesia Type:   MAC     Note:  Airway :Room Air  Patient transferred to:Phase II  Handoff Report: Identifed the Patient, Identified the Reponsible Provider, Reviewed the pertinent medical history, Discussed the surgical course, Reviewed Intra-OP anesthesia mangement and issues during anesthesia, Set expectations for post-procedure period and Allowed opportunity for questions and acknowledgement of understanding      Vitals: (Last set prior to Anesthesia Care Transfer)    CRNA VITALS  6/7/2018 1052 - 6/7/2018 1146      6/7/2018             Pulse: 58    Ht Rate: 58    SpO2: 100 %    Resp Rate (set): 8                Electronically Signed By: JESSE Del Castillo CRNA  June 7, 2018  11:46 AM

## 2018-06-07 NOTE — OR NURSING
Took juice and cookies w/o nausea.Patient and responsible adult given discharge instructions with no questions regarding instructions. Isai score 20. Pain level 0/10.  Discharged from unit via walking. Patient discharged to home.

## 2018-06-07 NOTE — ANESTHESIA POSTPROCEDURE EVALUATION
Patient: Vanesa Fleming    Procedure(s):  CATARACT EXTRACTION LEFT EYE WITH IMPLANT - Wound Class: I-Clean    Diagnosis:CATARACT LEFT EYE  Diagnosis Additional Information: No value filed.    Anesthesia Type:  MAC    Note:  Anesthesia Post Evaluation    Patient location during evaluation: PACU  Patient participation: Able to fully participate in evaluation  Level of consciousness: awake and alert  Pain management: adequate  Airway patency: patent  Cardiovascular status: acceptable  Respiratory status: acceptable  Hydration status: acceptable  PONV: none             Last vitals:  Vitals:    06/07/18 1135 06/07/18 1140 06/07/18 1145   BP: 134/85 148/84 163/91   Resp: 18 18 18   Temp:      SpO2: 97% 96% 98%         Electronically Signed By: Rico Elizabeth MD  June 7, 2018  12:06 PM

## 2018-06-25 ENCOUNTER — TRANSFERRED RECORDS (OUTPATIENT)
Dept: HEALTH INFORMATION MANAGEMENT | Facility: CLINIC | Age: 73
End: 2018-06-25

## 2018-08-30 ENCOUNTER — HOSPITAL ENCOUNTER (OUTPATIENT)
Dept: PHYSICAL THERAPY | Facility: HOSPITAL | Age: 73
Setting detail: THERAPIES SERIES
End: 2018-08-30
Attending: NURSE PRACTITIONER
Payer: MEDICARE

## 2018-08-30 PROCEDURE — 97162 PT EVAL MOD COMPLEX 30 MIN: CPT | Mod: GP

## 2018-08-30 PROCEDURE — 97110 THERAPEUTIC EXERCISES: CPT | Mod: GP

## 2018-08-30 PROCEDURE — G8979 MOBILITY GOAL STATUS: HCPCS | Mod: GP,CI

## 2018-08-30 PROCEDURE — G8978 MOBILITY CURRENT STATUS: HCPCS | Mod: GP,CJ

## 2018-08-30 NOTE — PROGRESS NOTES
Initial Physical Therapy Evaluation      Name: Vanesa Fleming MRN# [id number]   Age: 73 year old YOB: 1945     Date of Consultation: August 30, 2018  Primary care provider: Felipa Cool    Referring Physician: Silvina Jacobson CNP  Orders: Eval and Treat  Medical Diagnosis: Acute L sided LBP with L sided sciatica    Patient is a 74 y/o female who presents today with L sided low back pain and hip pain. States that the pain is located on the L side and will increase with prolonged standing activities. States that she lives in an apartment alone, but does have help from her daughter. Pain level currently is minimal, but will fluctuate throughout the day. Ibuprofen will decrease the overall pain .Pain level feels roughly the same as when she saw the doctor 10 days ago. Has been using heat on her back which has been helping with the discomfort and pain  Prior Level of Function: Has dealt with back pain prior, but nothing to this extent   Pain: 0-1/10 currently, but will increase with activities    Pain with coughing: no  Pain with sneezing: no  Pain with straining: yes  Change in bowel/bladder: no  Numbness or tingling in groin area: no      Community Support/Living Environment/Employment History: Retired     Patient/Family Goal: Decrease pain    Fall Screen:   Have you fallen 2 or more times in the last year? No  Have you fallen and had an injury in the last year? No  Timed up & go:   Is patient a fall risk? No    Past Medical History:   Past Medical History:   Diagnosis Date     Anxiety state     Encouraged patient to set goal regarding weight loss.  She does do quite a bit of increased/binge eating when she is facing more anxiety.  At this time as well. BuSpar to be increased to twice daily dosing.     Diverticulosis of colon     02/28/05     Essential hypertension     No Comments Provided     Gout     No Comments Provided     Hypothyroidism     No Comments Provided     Malignant  neoplasm of breast (female) (H)     Hx of breast cancer, left     Obesity     No Comments Provided     Other and unspecified hyperlipidemia     No Comments Provided     Other chronic nonalcoholic liver disease     8/05,found on CT scan       Past Surgical History:  Past Surgical History:   Procedure Laterality Date     COLONOSCOPY      2/28/05     LAPAROSCOPY DIAGNOSTIC (GYN)      with tubal banding     OTHER SURGICAL HISTORY      07/18/02,734839,BIOPSY BREAST,Left breast biopsy for a 3 centimeter left breast cancer     OTHER SURGICAL HISTORY      07/25/02,QTQ011,LYMPH NODE DISSECTION,Left lumpectomy with axillary node dissection     PHACOEMULSIFICATION WITH STANDARD INTRAOCULAR LENS IMPLANT Right 5/17/2018    Procedure: PHACOEMULSIFICATION WITH STANDARD INTRAOCULAR LENS IMPLANT;  CATARACT EXTRACTION RIGHT EYE WITH IMPLANT;  Surgeon: Andrews Oglesby MD;  Location: [facility]     PHACOEMULSIFICATION WITH STANDARD INTRAOCULAR LENS IMPLANT Left 6/7/2018    Procedure: PHACOEMULSIFICATION WITH STANDARD INTRAOCULAR LENS IMPLANT;  CATARACT EXTRACTION LEFT EYE WITH IMPLANT;  Surgeon: Andrews Oglesby MD;  Location: [facility] OR       Medications:   Current Outpatient Prescriptions   Medication Sig     ALLOPURINOL PO Take 300 mg by mouth daily     aspirin 325 MG tablet Take 1 tablet (325 mg) by mouth daily     atorvastatin (LIPITOR) 80 MG tablet Take 1 tablet (80 mg) by mouth daily     BUSPIRONE HCL PO Take 5 mg by mouth Take 7.5 mg daily     furosemide (LASIX) 40 MG tablet Take 40 mg by mouth 2 times daily     ketorolac (ACULAR) 0.5 % ophthalmic solution Place 1 drop into the right eye 4 times daily With taper.     LEVOTHYROXINE SODIUM PO Take 50 mcg by mouth daily     MECLIZINE HCL PO Take 25 mg by mouth 3 times daily as needed for dizziness     MELATONIN PO Take 3 mg by mouth nightly as needed Take 2 tablets at bedtime as needed with food.     METOPROLOL SUCCINATE ER PO Take 50 mg by mouth daily     multivitamin, therapeutic  "(THERA-VIT) TABS tablet Take 1 tablet by mouth daily     ofloxacin (OCUFLOX) 0.3 % ophthalmic solution Place 1 drop into the right eye 4 times daily     Omega-3 Fatty Acids (FISH OIL OMEGA-3 PO) Take 1,000 mg by mouth daily     prednisoLONE acetate (PRED FORTE) 1 % ophthalmic susp Place 1 drop into the right eye 4 times daily With taper.     SERTRALINE HCL PO Take 50 mg by mouth daily     No current facility-administered medications for this encounter.        Musculoskeletal Findings:     OBJECTIVE   Imaging: Recent imaging on low back on 6/25/18 which found minimal scoliotic changes, mild anterolisthesis of L4 on L5, and facet degenerative changes    Patient goals: \"Reduce pain.\"    OBJECTIVE    Observation: Appears to PT in no acute distress    Palpation: Tenderness with palpation to L sided gluteal area    Gait: Normal   Assistive devices: single end cane    Functional mobility:  Posture: Slightly stooped posture.  Sitting Posture:  Fair  Standing Posture:Fair  Correction of posture: No change    Neurological Assessment:  Reflexes - Right:  Patellar: 3+  Achilles: 3+    Reflexes - Left:  Patellar: 3+  Achilles: 3+    Myotomes:  Right lower extremity: unremarkable  Left lower extremity: unremarkable      Range of Motion:  Active Lumbar Spine:       Flexion: Toes to the ground       Extension: 10 - mild discomfort       Right sidebend: 10       Left sidebend: 10       Right rotation: 25       Left rotation: 25    Right Lower Extremity Range of Motion: within normal limits    Left Lower Extremity Range of Motion: within normal limits    Strength:  Abdominal Strength: Fair - able to elicit TA contraction with cueing    Right Lower Extremity Strength: 5/5 except 3+/5 hip abduction, 3+/5 hip extension, 4/5 hip flexion  Left Lower Extremity Strength: 5/5 except 3/5 hip abduction, 3+/5 hip extension, 4-/5 hip flexion    Special Tests:    Spine Special Tests:  Slump:    Left: -              Right: -  Straight Leg Raise:   "  Left: -      Right: -  Traction: No change  Prone Knee Bend:     Left:  + for tension     Right: + for tension  Compression:    Left:   -    Right:-  Repeated Movement Testing:   No change  Passive Intervertebral Accessory Movements:  No pain with central and unilateral PAs throughout lumbar spine   Prone Instability Test:       Hip Special Testss  Caridad:             KAYLAH:       -            Scour Test:                           Deuce Test:                      Leg Length:    -                      Trendelenburg s Sign:   -                    Hip PROM:  L LE: WFLs    R LE: WFLs     Patient's Concordant Sign: L sided LBP and gluteal pain    Outcome Measures:   Renetta STarT Sub-Score (Q5-9): 2  Renetta STarT Total Score (all 9): 4        Prognosis/Plan of Care: Fair  Appropriate for Physical Therapy Intervention: Yes     Goals:   Goal Identifier Short term goal #1   Goal Description Patient will be able to report 25% improvement in overall symptoms to allow increased ability to ambulate for longer than 10 minutes   Target Date 09/20/18   Date Met      Progress:     Goal Identifier Short term goal #2   Goal Description Patient will be able to walk for 10 minutes or longer with no onset of L sided low back pain to allow increasd ability to ambulate at grocery store   Target Date 09/20/18   Date Met      Progress:     Goal Identifier Long term goal #1   Goal Description Patient will improve score on Renetta Start back screening tool by 50% or greater to allow inreased ability to walk longer distances   Target Date 10/04/18   Date Met      Progress:     Goal Identifier Long term goal #2   Goal Description Patient will be able to increase bilateral hip abduction strength to 4+/5 to allow increased hip staiblity with functional activity   Target Date     Date Met      Progress:     Goal Identifier     Goal Description     Target Date     Date Met      Progress:     Goal Identifier     Goal Description     Target Date     Date  Met      Progress:     Goal Identifier     Goal Description     Target Date     Date Met      Progress:     Goal Identifier     Goal Description     Target Date     Date Met      Progress:     Progress Toward Goals:   Not assessed this period.          Planned Interventions: Therapeutic exercise, manual therapy, therapeutic activity, patient education    Patient presents today with signs and symptoms consistent of L sided facet joint irritation with associated weakness. Therapy today consisted of evaluation, patient education, and therapeutic exercise. Patient would benefit from continued PT sessions addressing overall pain and dysfunction with use of appropriate interventions.    Clinical Impressions:  Criteria for Skilled Therapeutic Intervention Met: Yes  PT Diagnosis: L sided facet joint irritation with associated lumbopelvic dysfunction  Influenced by the following impairments: Pain, decreased strength  Functional limitations due to impairment: Unable to walk long distances  Clinical presentation: Stable/Uncomplicated  Clinical presentation rationale: Patient presentation  Clinical Decision making (complexity): Moderate Complexity  Predicted Duration of Therapy Intervention (days/wks): 8 weeks  Risks and Benefits of therapy have been explained: Yes  Patient, Family & other staff in agreement with plan of care: Yes  Comments: Patient's daughter present during initial evaluation      Total Evaluation Time: 50     G-Codes (Eval Only Medicare/[employer])   Mobility: Walking & Moving Around (, , )    I certify the need for these services furnished under this plan of treatment and while under my care. (Physician co-signature of this document indicates review and certification of the therapy plan).      _____________________________     __________________________    ____________  Physician's Signature                 Date               Time

## 2018-09-05 ENCOUNTER — HOSPITAL ENCOUNTER (OUTPATIENT)
Dept: PHYSICAL THERAPY | Facility: HOSPITAL | Age: 73
Setting detail: THERAPIES SERIES
End: 2018-09-05
Attending: NURSE PRACTITIONER
Payer: MEDICARE

## 2018-09-05 PROCEDURE — 97110 THERAPEUTIC EXERCISES: CPT | Mod: GP

## 2018-09-07 ENCOUNTER — HOSPITAL ENCOUNTER (OUTPATIENT)
Dept: PHYSICAL THERAPY | Facility: HOSPITAL | Age: 73
Setting detail: THERAPIES SERIES
End: 2018-09-07
Attending: NURSE PRACTITIONER
Payer: MEDICARE

## 2018-09-07 PROCEDURE — 97140 MANUAL THERAPY 1/> REGIONS: CPT | Mod: GP

## 2018-09-07 PROCEDURE — 97110 THERAPEUTIC EXERCISES: CPT | Mod: GP

## 2018-09-11 ENCOUNTER — HOSPITAL ENCOUNTER (OUTPATIENT)
Dept: PHYSICAL THERAPY | Facility: HOSPITAL | Age: 73
Setting detail: THERAPIES SERIES
End: 2018-09-11
Attending: NURSE PRACTITIONER
Payer: MEDICARE

## 2018-09-11 PROCEDURE — 97140 MANUAL THERAPY 1/> REGIONS: CPT | Mod: GP

## 2018-09-11 PROCEDURE — 97110 THERAPEUTIC EXERCISES: CPT | Mod: GP

## 2018-09-20 ENCOUNTER — HOSPITAL ENCOUNTER (OUTPATIENT)
Dept: PHYSICAL THERAPY | Facility: HOSPITAL | Age: 73
Setting detail: THERAPIES SERIES
End: 2018-09-20
Attending: NURSE PRACTITIONER
Payer: MEDICARE

## 2018-09-20 PROCEDURE — 97140 MANUAL THERAPY 1/> REGIONS: CPT | Mod: GP

## 2018-09-20 PROCEDURE — 97110 THERAPEUTIC EXERCISES: CPT | Mod: GP

## 2019-01-29 NOTE — PROGRESS NOTES
Physical Therapy Discharge Note      Name: Vanesa Fleming MRN# [id number]   Age: 73 year old YOB: 1945        Requesting provider: Felipa Cool CNP  Diagnosis: Acute L sided LBP with L sided sciatica  Prescription: Evaluate and treat  Frequency/duration: Therapists discretion  Services provided: The patient was seen for a total of 5 visits from 8/30/18 to 9/20/18.  See the previous documentation for treatment details.        Plan  Intervention:  The patient has not contacted the department since the previous visit.  For additional information regarding goals and status as of last, please refer to prior documentation.  It is uncertain if the patient has attained any further goals at this time.  The patient will be formally discharged from physical therapy care.  We will resume physical therapy services as per physician referral and discretion.     Follow up:  Recheck with physician as needed

## 2019-03-12 ENCOUNTER — HOSPITAL ENCOUNTER (EMERGENCY)
Facility: HOSPITAL | Age: 74
Discharge: HOME | End: 2019-03-12
Attending: INTERNAL MEDICINE | Admitting: INTERNAL MEDICINE
Payer: MEDICARE

## 2019-03-12 ENCOUNTER — APPOINTMENT (OUTPATIENT)
Dept: GENERAL RADIOLOGY | Facility: HOSPITAL | Age: 74
End: 2019-03-12
Attending: INTERNAL MEDICINE
Payer: MEDICARE

## 2019-03-12 VITALS
DIASTOLIC BLOOD PRESSURE: 54 MMHG | RESPIRATION RATE: 16 BRPM | TEMPERATURE: 99.4 F | SYSTOLIC BLOOD PRESSURE: 94 MMHG | OXYGEN SATURATION: 92 %

## 2019-03-12 DIAGNOSIS — J10.1 INFLUENZA A: ICD-10-CM

## 2019-03-12 LAB
ALBUMIN SERPL-MCNC: 3.7 G/DL (ref 3.4–5)
ALP SERPL-CCNC: 113 U/L (ref 40–150)
ALT SERPL W P-5'-P-CCNC: 26 U/L (ref 0–50)
ANION GAP SERPL CALCULATED.3IONS-SCNC: 6 MMOL/L (ref 3–14)
AST SERPL W P-5'-P-CCNC: 26 U/L (ref 0–45)
BASOPHILS # BLD AUTO: 0 10E9/L (ref 0–0.2)
BASOPHILS NFR BLD AUTO: 0.4 %
BILIRUB SERPL-MCNC: 0.4 MG/DL (ref 0.2–1.3)
BUN SERPL-MCNC: 18 MG/DL (ref 7–30)
CALCIUM SERPL-MCNC: 8.6 MG/DL (ref 8.5–10.1)
CHLORIDE SERPL-SCNC: 103 MMOL/L (ref 94–109)
CO2 SERPL-SCNC: 28 MMOL/L (ref 20–32)
CREAT SERPL-MCNC: 1.4 MG/DL (ref 0.52–1.04)
CRP SERPL-MCNC: 13.4 MG/L (ref 0–8)
DIFFERENTIAL METHOD BLD: ABNORMAL
EOSINOPHIL # BLD AUTO: 0 10E9/L (ref 0–0.7)
EOSINOPHIL NFR BLD AUTO: 0.2 %
ERYTHROCYTE [DISTWIDTH] IN BLOOD BY AUTOMATED COUNT: 14 % (ref 10–15)
FLUAV+FLUBV RNA SPEC QL NAA+PROBE: NEGATIVE
FLUAV+FLUBV RNA SPEC QL NAA+PROBE: POSITIVE
GFR SERPL CREATININE-BSD FRML MDRD: 36 ML/MIN/{1.73_M2}
GLUCOSE SERPL-MCNC: 93 MG/DL (ref 70–99)
HCT VFR BLD AUTO: 34.6 % (ref 35–47)
HGB BLD-MCNC: 11.9 G/DL (ref 11.7–15.7)
IMM GRANULOCYTES # BLD: 0 10E9/L (ref 0–0.4)
IMM GRANULOCYTES NFR BLD: 0.5 %
LACTATE BLD-SCNC: 0.9 MMOL/L (ref 0.7–2)
LYMPHOCYTES # BLD AUTO: 1.4 10E9/L (ref 0.8–5.3)
LYMPHOCYTES NFR BLD AUTO: 24.5 %
MCH RBC QN AUTO: 30.7 PG (ref 26.5–33)
MCHC RBC AUTO-ENTMCNC: 34.4 G/DL (ref 31.5–36.5)
MCV RBC AUTO: 89 FL (ref 78–100)
MONOCYTES # BLD AUTO: 0.9 10E9/L (ref 0–1.3)
MONOCYTES NFR BLD AUTO: 16.2 %
NEUTROPHILS # BLD AUTO: 3.2 10E9/L (ref 1.6–8.3)
NEUTROPHILS NFR BLD AUTO: 58.2 %
NRBC # BLD AUTO: 0 10*3/UL
NRBC BLD AUTO-RTO: 0 /100
PLATELET # BLD AUTO: 177 10E9/L (ref 150–450)
POTASSIUM SERPL-SCNC: 3.2 MMOL/L (ref 3.4–5.3)
PROT SERPL-MCNC: 7.3 G/DL (ref 6.8–8.8)
RBC # BLD AUTO: 3.87 10E12/L (ref 3.8–5.2)
RSV RNA SPEC NAA+PROBE: NEGATIVE
SODIUM SERPL-SCNC: 137 MMOL/L (ref 133–144)
SPECIMEN SOURCE: ABNORMAL
WBC # BLD AUTO: 5.5 10E9/L (ref 4–11)

## 2019-03-12 PROCEDURE — 87040 BLOOD CULTURE FOR BACTERIA: CPT | Performed by: INTERNAL MEDICINE

## 2019-03-12 PROCEDURE — 93005 ELECTROCARDIOGRAM TRACING: CPT

## 2019-03-12 PROCEDURE — 99285 EMERGENCY DEPT VISIT HI MDM: CPT | Mod: 25

## 2019-03-12 PROCEDURE — 25000128 H RX IP 250 OP 636: Performed by: INTERNAL MEDICINE

## 2019-03-12 PROCEDURE — 71046 X-RAY EXAM CHEST 2 VIEWS: CPT | Mod: TC

## 2019-03-12 PROCEDURE — 85025 COMPLETE CBC W/AUTO DIFF WBC: CPT | Performed by: INTERNAL MEDICINE

## 2019-03-12 PROCEDURE — 99284 EMERGENCY DEPT VISIT MOD MDM: CPT | Mod: Z6 | Performed by: INTERNAL MEDICINE

## 2019-03-12 PROCEDURE — A9270 NON-COVERED ITEM OR SERVICE: HCPCS | Mod: GY | Performed by: INTERNAL MEDICINE

## 2019-03-12 PROCEDURE — 36415 COLL VENOUS BLD VENIPUNCTURE: CPT | Performed by: INTERNAL MEDICINE

## 2019-03-12 PROCEDURE — 93010 ELECTROCARDIOGRAM REPORT: CPT | Performed by: INTERNAL MEDICINE

## 2019-03-12 PROCEDURE — 87631 RESP VIRUS 3-5 TARGETS: CPT | Performed by: INTERNAL MEDICINE

## 2019-03-12 PROCEDURE — 96361 HYDRATE IV INFUSION ADD-ON: CPT

## 2019-03-12 PROCEDURE — 86140 C-REACTIVE PROTEIN: CPT | Performed by: INTERNAL MEDICINE

## 2019-03-12 PROCEDURE — 96360 HYDRATION IV INFUSION INIT: CPT

## 2019-03-12 PROCEDURE — 83605 ASSAY OF LACTIC ACID: CPT | Performed by: INTERNAL MEDICINE

## 2019-03-12 PROCEDURE — 80053 COMPREHEN METABOLIC PANEL: CPT | Performed by: INTERNAL MEDICINE

## 2019-03-12 PROCEDURE — 25000132 ZZH RX MED GY IP 250 OP 250 PS 637: Mod: GY | Performed by: INTERNAL MEDICINE

## 2019-03-12 RX ORDER — ASPIRIN 81 MG/1
162 TABLET ORAL DAILY
COMMUNITY

## 2019-03-12 RX ORDER — OSELTAMIVIR PHOSPHATE 75 MG/1
75 CAPSULE ORAL ONCE
Status: COMPLETED | OUTPATIENT
Start: 2019-03-12 | End: 2019-03-12

## 2019-03-12 RX ORDER — OSELTAMIVIR PHOSPHATE 75 MG/1
75 CAPSULE ORAL 2 TIMES DAILY
Qty: 10 CAPSULE | Refills: 0 | Status: SHIPPED | OUTPATIENT
Start: 2019-03-12 | End: 2019-03-17

## 2019-03-12 RX ADMIN — OSELTAMIVIR PHOSPHATE 75 MG: 75 CAPSULE ORAL at 08:34

## 2019-03-12 RX ADMIN — SODIUM CHLORIDE 1000 ML: 9 INJECTION, SOLUTION INTRAVENOUS at 06:41

## 2019-03-12 ASSESSMENT — ENCOUNTER SYMPTOMS
ANAL BLEEDING: 0
VOMITING: 0
NECK PAIN: 0
BACK PAIN: 0
COLOR CHANGE: 0
DIAPHORESIS: 0
FLANK PAIN: 0
FEVER: 1
BLOOD IN STOOL: 0
CHEST TIGHTNESS: 0
CONFUSION: 0
SHORTNESS OF BREATH: 0
WOUND: 0
LIGHT-HEADEDNESS: 0
CHILLS: 1
DYSURIA: 0
NAUSEA: 1
PALPITATIONS: 0
DIZZINESS: 1
NECK STIFFNESS: 0
MYALGIAS: 1
ABDOMINAL DISTENTION: 0
ABDOMINAL PAIN: 0
HEADACHES: 0
WHEEZING: 0
COUGH: 1
HEMATURIA: 0
ARTHRALGIAS: 0
NUMBNESS: 0
VOICE CHANGE: 0

## 2019-03-12 NOTE — ED AVS SNAPSHOT
[facility]  [address] [address]  Phone:  [phone]                                    Vanesa Fleming   MRN: [id number]    Department:  [facility]   Date of Visit:  3/12/2019           After Visit Summary Signature Page    I have received my discharge instructions, and my questions have been answered. I have discussed any challenges I see with this plan with the nurse or doctor.    ..........................................................................................................................................  Patient/Patient Representative Signature      ..........................................................................................................................................  Patient Representative Print Name and Relationship to Patient    ..................................................               ................................................  Date                                   Time    ..........................................................................................................................................  Reviewed by Signature/Title    ...................................................              ..............................................  Date                                               Time          22EPIC Rev 08/18

## 2019-03-12 NOTE — ED PROVIDER NOTES
History     Chief Complaint   Patient presents with     Dizziness     The history is provided by the patient.   Cough   Cough characteristics:  Dry  Sputum characteristics:  Nondescript  Severity:  Moderate  Onset quality:  Gradual  Duration:  3 days  Timing:  Constant  Chronicity:  New  Associated symptoms: chills, fever and myalgias    Associated symptoms: no chest pain, no diaphoresis, no headaches, no rash, no shortness of breath and no wheezing      Allergies:  No Known Allergies    Problem List:    Patient Active Problem List    Diagnosis Date Noted     ACP (advance care planning) 04/05/2017     Priority: Medium     Advance Care Planning 4/5/2017: Receipt of ACP document:  Received: Health Care Directive which was witnessed or notarized on [id number].  Document not previously scanned.  Reviewed for validity as medical order and sent to be scanned.  Code Status reflects choices in most recent ACP document..  Confirmed/documented designated decision maker(s).  Added by Eli Jaimes         Benign essential hypertension 01/24/2017     Priority: Medium     Mild recurrent major depression (H) 01/24/2017     Priority: Medium     Mild cognitive impairment with memory loss 01/24/2017     Priority: Medium     Elevated LDL cholesterol level 01/24/2017     Priority: Medium     CVA (cerebral vascular accident) (H) 01/24/2017     Priority: Medium     TIA (transient ischemic attack) 01/23/2017     Priority: Medium        Past Medical History:    Past Medical History:   Diagnosis Date     Anxiety state      Diverticulosis of colon      Essential hypertension      Gout      Hypothyroidism      Malignant neoplasm of breast (female) (H)      Obesity      Other and unspecified hyperlipidemia      Other chronic nonalcoholic liver disease        Past Surgical History:    Past Surgical History:   Procedure Laterality Date     COLONOSCOPY      2/28/05     LAPAROSCOPY DIAGNOSTIC (GYN)      with tubal banding     OTHER SURGICAL  HISTORY      02,144208,BIOPSY BREAST,Left breast biopsy for a 3 centimeter left breast cancer     OTHER SURGICAL HISTORY      02,ONN066,LYMPH NODE DISSECTION,Left lumpectomy with axillary node dissection     PHACOEMULSIFICATION WITH STANDARD INTRAOCULAR LENS IMPLANT Right 2018    Procedure: PHACOEMULSIFICATION WITH STANDARD INTRAOCULAR LENS IMPLANT;  CATARACT EXTRACTION RIGHT EYE WITH IMPLANT;  Surgeon: Andrews Oglesby MD;  Location: [facility]     PHACOEMULSIFICATION WITH STANDARD INTRAOCULAR LENS IMPLANT Left 2018    Procedure: PHACOEMULSIFICATION WITH STANDARD INTRAOCULAR LENS IMPLANT;  CATARACT EXTRACTION LEFT EYE WITH IMPLANT;  Surgeon: Andrews Oglesby MD;  Location: [facility]       Family History:    Family History   Problem Relation Age of Onset     Heart Disease Mother         Heart Disease, age 63 of myocardial infarction,     Diabetes Mother         Diabetes     Hypertension Mother         Hypertension     Thyroid Disease Mother         Thyroid Disease     Other - See Comments Father         Alzheimer's     Substance Abuse Maternal Uncle         Alcohol/Drug,Chemical dependency     Other - See Comments Son         Ankylosing spondylitis     Colon Cancer Brother         Cancer-colon,Colon cancer age 58     Prostate Cancer Brother         Cancer-prostate     Hypertension Brother         Hypertension     Hypertension Sister         Hypertension     Seizure Disorder Sister         Seizures     Other - See Comments Sister         Psychiatric illness,depression     Other - See Comments Maternal Aunt         Psychiatric illness       Social History:  Marital Status:   [5]  Social History     Tobacco Use     Smoking status: Former Smoker     Smokeless tobacco: Never Used   Substance Use Topics     Alcohol use: Not on file     Drug use: Unknown     Types: Other     Comment: Drug use: Not Asked        Medications:      ALLOPURINOL PO   aspirin 81 MG EC tablet   atorvastatin (LIPITOR) 80  MG tablet   BUSPIRONE HCL PO   furosemide (LASIX) 40 MG tablet   LEVOTHYROXINE SODIUM PO   MECLIZINE HCL PO   MELATONIN PO   METOPROLOL SUCCINATE ER PO   multivitamin, therapeutic (THERA-VIT) TABS tablet   Omega-3 Fatty Acids (FISH OIL OMEGA-3 PO)   oseltamivir (TAMIFLU) 75 MG capsule   SERTRALINE HCL PO         Review of Systems   Constitutional: Positive for chills and fever. Negative for diaphoresis.   HENT: Negative for voice change.    Eyes: Negative for visual disturbance.   Respiratory: Positive for cough. Negative for chest tightness, shortness of breath and wheezing.    Cardiovascular: Negative for chest pain, palpitations and leg swelling.   Gastrointestinal: Positive for nausea. Negative for abdominal distention, abdominal pain, anal bleeding, blood in stool and vomiting.   Genitourinary: Negative for decreased urine volume, dysuria, flank pain and hematuria.   Musculoskeletal: Positive for myalgias. Negative for arthralgias, back pain, gait problem, neck pain and neck stiffness.   Skin: Negative for color change, pallor, rash and wound.   Neurological: Positive for dizziness. Negative for syncope, light-headedness, numbness and headaches.   Psychiatric/Behavioral: Negative for confusion and suicidal ideas.       Physical Exam   BP: 100/59  Heart Rate: 56  Temp: (!) 101.5  F (38.6  C)  Resp: 19  SpO2: 92 %      Physical Exam   Constitutional: She is oriented to person, place, and time. She appears well-developed and well-nourished.   HENT:   Head: Normocephalic and atraumatic.   Mouth/Throat: No oropharyngeal exudate.   Eyes: Conjunctivae are normal. Pupils are equal, round, and reactive to light.   Neck: Normal range of motion. Neck supple. No JVD present. No tracheal deviation present. No thyromegaly present.   Cardiovascular: Normal rate, regular rhythm, normal heart sounds and intact distal pulses. Exam reveals no gallop and no friction rub.   No murmur heard.  Pulmonary/Chest: Effort normal and  breath sounds normal. No stridor. No respiratory distress. She has no wheezes. She has no rales. She exhibits no tenderness.   Abdominal: Soft. Bowel sounds are normal. She exhibits no distension and no mass. There is no tenderness. There is no rebound and no guarding.   Musculoskeletal: Normal range of motion. She exhibits no edema or tenderness.   Lymphadenopathy:     She has no cervical adenopathy.   Neurological: She is alert and oriented to person, place, and time.   Skin: Skin is warm and dry. No rash noted. No erythema. No pallor.   Psychiatric: Her behavior is normal.   Nursing note and vitals reviewed.      ED Course        Procedures                   Results for orders placed or performed during the hospital encounter of 03/12/19 (from the past 24 hour(s))   CBC with platelets differential   Result Value Ref Range    WBC 5.5 4.0 - 11.0 10e9/L    RBC Count 3.87 3.8 - 5.2 10e12/L    Hemoglobin 11.9 11.7 - 15.7 g/dL    Hematocrit 34.6 (L) 35.0 - 47.0 %    MCV 89 78 - 100 fl    MCH 30.7 26.5 - 33.0 pg    MCHC 34.4 31.5 - 36.5 g/dL    RDW 14.0 10.0 - 15.0 %    Platelet Count 177 150 - 450 10e9/L    Diff Method Automated Method     % Neutrophils 58.2 %    % Lymphocytes 24.5 %    % Monocytes 16.2 %    % Eosinophils 0.2 %    % Basophils 0.4 %    % Immature Granulocytes 0.5 %    Nucleated RBCs 0 0 /100    Absolute Neutrophil 3.2 1.6 - 8.3 10e9/L    Absolute Lymphocytes 1.4 0.8 - 5.3 10e9/L    Absolute Monocytes 0.9 0.0 - 1.3 10e9/L    Absolute Eosinophils 0.0 0.0 - 0.7 10e9/L    Absolute Basophils 0.0 0.0 - 0.2 10e9/L    Abs Immature Granulocytes 0.0 0 - 0.4 10e9/L    Absolute Nucleated RBC 0.0    Comprehensive metabolic panel   Result Value Ref Range    Sodium 137 133 - 144 mmol/L    Potassium 3.2 (L) 3.4 - 5.3 mmol/L    Chloride 103 94 - 109 mmol/L    Carbon Dioxide 28 20 - 32 mmol/L    Anion Gap 6 3 - 14 mmol/L    Glucose 93 70 - 99 mg/dL    Urea Nitrogen 18 7 - 30 mg/dL    Creatinine 1.40 (H) 0.52 - 1.04 mg/dL     GFR Estimate 36 (L) >60 mL/min/[1.73_m2]    GFR Estimate If Black 42 (L) >60 mL/min/[1.73_m2]    Calcium 8.6 8.5 - 10.1 mg/dL    Bilirubin Total 0.4 0.2 - 1.3 mg/dL    Albumin 3.7 3.4 - 5.0 g/dL    Protein Total 7.3 6.8 - 8.8 g/dL    Alkaline Phosphatase 113 40 - 150 U/L    ALT 26 0 - 50 U/L    AST 26 0 - 45 U/L   CRP inflammation   Result Value Ref Range    CRP Inflammation 13.4 (H) 0.0 - 8.0 mg/L   Lactic acid whole blood   Result Value Ref Range    Lactic Acid 0.9 0.7 - 2.0 mmol/L   Influenza A and B and RSV PCR   Result Value Ref Range    Specimen Description Nasopharyngeal     Influenza A PCR Positive (A) NEG^Negative    Influenza B PCR Negative NEG^Negative    Resp Syncytial Virus Negative NEG^Negative   XR Chest 2 Views    Narrative    PROCEDURE:  XR CHEST 2 VW    HISTORY:  fever.     COMPARISON:  March 19, 2017    FINDINGS:   The cardiac silhouette is normal in size. The pulmonary vasculature is  normal.  The lungs are clear. No pleural effusion or pneumothorax.      Impression    IMPRESSION:  No acute cardiopulmonary disease.      NAS POWERS MD       Medications   0.9% sodium chloride BOLUS (0 mLs Intravenous Stopped 3/12/19 0820)   oseltamivir (TAMIFLU) capsule 75 mg (75 mg Oral Given 3/12/19 0834)       Assessments & Plan (with Medical Decision Making)   Cough fever for 3 day, feeling dizzy that describe it as lightheaded since last night 12 AM  Feeling most dizzy when moving her head  As per daughter at bedside she has had mild memory problem since her mini stroke 2 years ago.  Labs reviewd, CKD, no significnact chagne  EKG: sinus yessy 2 to beta blocker  CXR negative,   After receiving IVF fluid symptoms resolved,   Influenza test positive, Tamiflu started, follow-up with PCP  I have reviewed the nursing notes.    I have reviewed the findings, diagnosis, plan and need for follow up with the patient.         Medication List      Started    oseltamivir 75 MG capsule  Commonly known as:   TAMIFLU  75 mg, Oral, 2 TIMES DAILY            Final diagnoses:   Influenza A       3/12/2019   [facility]     Sebas Boston MD  03/13/19 0614

## 2019-03-12 NOTE — ED TRIAGE NOTES
Pt brought in by family for complaints of dizziness and a cough. Pt reports she has been having intermittent dizziness for the last few years but over the last 4 days she has also had a cough which is what prompted the visit this am. Pt arrived in . Able to transfer to cart and change into a gown independently.

## 2019-03-18 LAB
BACTERIA SPEC CULT: NORMAL
BACTERIA SPEC CULT: NORMAL
Lab: NORMAL
Lab: NORMAL
SPECIMEN SOURCE: NORMAL
SPECIMEN SOURCE: NORMAL

## 2019-11-05 ENCOUNTER — HEALTH MAINTENANCE LETTER (OUTPATIENT)
Age: 74
End: 2019-11-05

## 2019-12-16 ENCOUNTER — HOSPITAL ENCOUNTER (OUTPATIENT)
Dept: GENERAL RADIOLOGY | Facility: HOSPITAL | Age: 74
Discharge: HOME | End: 2019-12-16
Attending: NURSE PRACTITIONER | Admitting: NURSE PRACTITIONER
Payer: MEDICARE

## 2019-12-16 ENCOUNTER — HOSPITAL ENCOUNTER (OUTPATIENT)
Dept: MRI IMAGING | Facility: HOSPITAL | Age: 74
End: 2019-12-16
Attending: NURSE PRACTITIONER
Payer: MEDICARE

## 2019-12-16 DIAGNOSIS — M25.312 OTHER INSTABILITY, LEFT SHOULDER: ICD-10-CM

## 2019-12-16 DIAGNOSIS — M25.512 PAIN IN LEFT SHOULDER: ICD-10-CM

## 2019-12-16 DIAGNOSIS — G89.29 OTHER CHRONIC PAIN: ICD-10-CM

## 2019-12-16 PROCEDURE — 23350 INJECTION FOR SHOULDER X-RAY: CPT | Mod: TC

## 2019-12-16 PROCEDURE — A9585 GADOBUTROL INJECTION: HCPCS | Performed by: RADIOLOGY

## 2019-12-16 PROCEDURE — 73222 MRI JOINT UPR EXTREM W/DYE: CPT | Mod: TC,LT

## 2019-12-16 PROCEDURE — 25500064 ZZH RX 255 OP 636: Performed by: RADIOLOGY

## 2019-12-16 RX ORDER — LIDOCAINE HYDROCHLORIDE 10 MG/ML
INJECTION, SOLUTION EPIDURAL; INFILTRATION; INTRACAUDAL; PERINEURAL
Status: DISCONTINUED
Start: 2019-12-16 | End: 2019-12-17 | Stop reason: HOSPADM

## 2019-12-16 RX ORDER — GADOBUTROL 604.72 MG/ML
2 INJECTION INTRAVENOUS ONCE
Status: COMPLETED | OUTPATIENT
Start: 2019-12-16 | End: 2019-12-16

## 2019-12-16 RX ORDER — IOPAMIDOL 612 MG/ML
50 INJECTION, SOLUTION INTRAVASCULAR ONCE
Status: COMPLETED | OUTPATIENT
Start: 2019-12-16 | End: 2019-12-16

## 2019-12-16 RX ORDER — LIDOCAINE HYDROCHLORIDE 10 MG/ML
10 INJECTION, SOLUTION INFILTRATION; PERINEURAL ONCE
Status: COMPLETED | OUTPATIENT
Start: 2019-12-16 | End: 2019-12-16

## 2019-12-16 RX ADMIN — GADOBUTROL 0.1 ML: 604.72 INJECTION INTRAVENOUS at 15:02

## 2019-12-16 RX ADMIN — IOPAMIDOL 2 ML: 612 INJECTION, SOLUTION INTRAVENOUS at 15:00

## 2019-12-16 RX ADMIN — LIDOCAINE HYDROCHLORIDE 4 ML: 10 INJECTION, SOLUTION INFILTRATION; PERINEURAL at 15:00

## 2020-02-16 ENCOUNTER — HEALTH MAINTENANCE LETTER (OUTPATIENT)
Age: 75
End: 2020-02-16

## 2020-05-12 ENCOUNTER — APPOINTMENT (OUTPATIENT)
Dept: CT IMAGING | Facility: HOSPITAL | Age: 75
End: 2020-05-12
Attending: EMERGENCY MEDICINE
Payer: MEDICARE

## 2020-05-12 ENCOUNTER — HOSPITAL ENCOUNTER (EMERGENCY)
Facility: HOSPITAL | Age: 75
Discharge: HOME | End: 2020-05-12
Attending: EMERGENCY MEDICINE | Admitting: EMERGENCY MEDICINE
Payer: MEDICARE

## 2020-05-12 ENCOUNTER — APPOINTMENT (OUTPATIENT)
Dept: GENERAL RADIOLOGY | Facility: HOSPITAL | Age: 75
End: 2020-05-12
Attending: EMERGENCY MEDICINE
Payer: MEDICARE

## 2020-05-12 VITALS
SYSTOLIC BLOOD PRESSURE: 145 MMHG | HEART RATE: 59 BPM | RESPIRATION RATE: 18 BRPM | OXYGEN SATURATION: 95 % | TEMPERATURE: 98.2 F | DIASTOLIC BLOOD PRESSURE: 79 MMHG

## 2020-05-12 DIAGNOSIS — S09.90XA CLOSED HEAD INJURY, INITIAL ENCOUNTER: ICD-10-CM
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
## 2020-05-12 LAB
ANION GAP SERPL CALCULATED.3IONS-SCNC: 5 MMOL/L (ref 3–14)
BASOPHILS # BLD AUTO: 0.1 10E9/L (ref 0–0.2)
BASOPHILS NFR BLD AUTO: 0.7 %
BUN SERPL-MCNC: 19 MG/DL (ref 7–30)
CALCIUM SERPL-MCNC: 9.8 MG/DL (ref 8.5–10.1)
CHLORIDE SERPL-SCNC: 102 MMOL/L (ref 94–109)
CO2 SERPL-SCNC: 31 MMOL/L (ref 20–32)
CREAT SERPL-MCNC: 1.49 MG/DL (ref 0.52–1.04)
DIFFERENTIAL METHOD BLD: NORMAL
EOSINOPHIL # BLD AUTO: 0.1 10E9/L (ref 0–0.7)
EOSINOPHIL NFR BLD AUTO: 1.4 %
ERYTHROCYTE [DISTWIDTH] IN BLOOD BY AUTOMATED COUNT: 14 % (ref 10–15)
ETHANOL SERPL-MCNC: <0.01 G/DL
GFR SERPL CREATININE-BSD FRML MDRD: 34 ML/MIN/{1.73_M2}
GLUCOSE BLDC GLUCOMTR-MCNC: 82 MG/DL (ref 70–99)
GLUCOSE SERPL-MCNC: 105 MG/DL (ref 70–99)
HCT VFR BLD AUTO: 40.3 % (ref 35–47)
HGB BLD-MCNC: 13.3 G/DL (ref 11.7–15.7)
IMM GRANULOCYTES # BLD: 0 10E9/L (ref 0–0.4)
IMM GRANULOCYTES NFR BLD: 0.4 %
INR PPP: 0.98 (ref 0.86–1.14)
LYMPHOCYTES # BLD AUTO: 2.1 10E9/L (ref 0.8–5.3)
LYMPHOCYTES NFR BLD AUTO: 28 %
MCH RBC QN AUTO: 30.2 PG (ref 26.5–33)
MCHC RBC AUTO-ENTMCNC: 33 G/DL (ref 31.5–36.5)
MCV RBC AUTO: 92 FL (ref 78–100)
MONOCYTES # BLD AUTO: 0.6 10E9/L (ref 0–1.3)
MONOCYTES NFR BLD AUTO: 7.7 %
NEUTROPHILS # BLD AUTO: 4.6 10E9/L (ref 1.6–8.3)
NEUTROPHILS NFR BLD AUTO: 61.8 %
NRBC # BLD AUTO: 0 10*3/UL
NRBC BLD AUTO-RTO: 0 /100
PLATELET # BLD AUTO: 227 10E9/L (ref 150–450)
POTASSIUM SERPL-SCNC: 3.4 MMOL/L (ref 3.4–5.3)
RBC # BLD AUTO: 4.4 10E12/L (ref 3.8–5.2)
SODIUM SERPL-SCNC: 138 MMOL/L (ref 133–144)
TROPONIN I SERPL-MCNC: <0.015 UG/L (ref 0–0.04)
WBC # BLD AUTO: 7.4 10E9/L (ref 4–11)

## 2020-05-12 PROCEDURE — 80048 BASIC METABOLIC PNL TOTAL CA: CPT | Performed by: EMERGENCY MEDICINE

## 2020-05-12 PROCEDURE — 36415 COLL VENOUS BLD VENIPUNCTURE: CPT | Performed by: EMERGENCY MEDICINE

## 2020-05-12 PROCEDURE — 93010 ELECTROCARDIOGRAM REPORT: CPT | Performed by: INTERNAL MEDICINE

## 2020-05-12 PROCEDURE — 71045 X-RAY EXAM CHEST 1 VIEW: CPT | Mod: TC

## 2020-05-12 PROCEDURE — 99285 EMERGENCY DEPT VISIT HI MDM: CPT | Mod: Z6 | Performed by: EMERGENCY MEDICINE

## 2020-05-12 PROCEDURE — 85025 COMPLETE CBC W/AUTO DIFF WBC: CPT | Performed by: EMERGENCY MEDICINE

## 2020-05-12 PROCEDURE — 99285 EMERGENCY DEPT VISIT HI MDM: CPT | Mod: 25

## 2020-05-12 PROCEDURE — 72125 CT NECK SPINE W/O DYE: CPT | Mod: TC

## 2020-05-12 PROCEDURE — 70450 CT HEAD/BRAIN W/O DYE: CPT | Mod: TC

## 2020-05-12 PROCEDURE — 85610 PROTHROMBIN TIME: CPT

## 2020-05-12 PROCEDURE — 84484 ASSAY OF TROPONIN QUANT: CPT | Performed by: EMERGENCY MEDICINE

## 2020-05-12 PROCEDURE — 93005 ELECTROCARDIOGRAM TRACING: CPT

## 2020-05-12 PROCEDURE — 80320 DRUG SCREEN QUANTALCOHOLS: CPT | Performed by: EMERGENCY MEDICINE

## 2020-05-12 PROCEDURE — 00000146 ZZHCL STATISTIC GLUCOSE BY METER IP

## 2020-05-12 RX ORDER — DONEPEZIL HYDROCHLORIDE 5 MG/1
TABLET, FILM COATED ORAL
COMMUNITY
Start: 2019-12-02

## 2020-05-12 RX ORDER — TRAZODONE HYDROCHLORIDE 50 MG/1
TABLET, FILM COATED ORAL
COMMUNITY
Start: 2019-12-03

## 2020-05-12 NOTE — ED NOTES
AVS given to pt and called dtr and gave specific instructions for head injury. Dtr verbalizes understanding.  Rates pain better at 3/10.  VS as charted. Brought to the ED doors and to car. Family here to discharge.

## 2020-05-12 NOTE — ED PROVIDER NOTES
History     Chief Complaint   Patient presents with     Fall     HPI  Vanesa Fleming is a 74 year old female who is today with complaints of a fall.  Patient status post fall approximately 2 to 3 hours ago.  Patient states that she was walking when she actually tripped on the carpet falling to the floor.  There was no loss of consciousness.  Patient able to get up on her own.  She lives at the [facility] and contacted family who came to see her.  They noted that he hematoma in the back of her head and felt he needed to be evaluated.  Patient is on aspirin.  Patient otherwise in usual state of health.    Allergies:  No Known Allergies    Problem List:    Patient Active Problem List    Diagnosis Date Noted     ACP (advance care planning) 04/05/2017     Priority: Medium     Advance Care Planning 4/5/2017: Receipt of ACP document:  Received: Health Care Directive which was witnessed or notarized on [id number].  Document not previously scanned.  Reviewed for validity as medical order and sent to be scanned.  Code Status reflects choices in most recent ACP document..  Confirmed/documented designated decision maker(s).  Added by Eli Jaimes         Benign essential hypertension 01/24/2017     Priority: Medium     Mild recurrent major depression (H) 01/24/2017     Priority: Medium     Mild cognitive impairment with memory loss 01/24/2017     Priority: Medium     Elevated LDL cholesterol level 01/24/2017     Priority: Medium     CVA (cerebral vascular accident) (H) 01/24/2017     Priority: Medium     TIA (transient ischemic attack) 01/23/2017     Priority: Medium        Past Medical History:    Past Medical History:   Diagnosis Date     Anxiety state      Diverticulosis of colon      Essential hypertension      Gout      Hypothyroidism      Malignant neoplasm of breast (female) (H)      Obesity      Other and unspecified hyperlipidemia      Other chronic nonalcoholic liver disease        Past Surgical History:    Past  Surgical History:   Procedure Laterality Date     COLONOSCOPY      05     LAPAROSCOPY DIAGNOSTIC (GYN)      with tubal banding     OTHER SURGICAL HISTORY      02,[id number],BIOPSY BREAST,Left breast biopsy for a 3 centimeter left breast cancer     OTHER SURGICAL HISTORY      02,MMD043,LYMPH NODE DISSECTION,Left lumpectomy with axillary node dissection     PHACOEMULSIFICATION WITH STANDARD INTRAOCULAR LENS IMPLANT Right 2018    Procedure: PHACOEMULSIFICATION WITH STANDARD INTRAOCULAR LENS IMPLANT;  CATARACT EXTRACTION RIGHT EYE WITH IMPLANT;  Surgeon: Andrews Oglesby MD;  Location: [facility]     PHACOEMULSIFICATION WITH STANDARD INTRAOCULAR LENS IMPLANT Left 2018    Procedure: PHACOEMULSIFICATION WITH STANDARD INTRAOCULAR LENS IMPLANT;  CATARACT EXTRACTION LEFT EYE WITH IMPLANT;  Surgeon: Andrews Oglesby MD;  Location: [facility]       Family History:    Family History   Problem Relation Age of Onset     Heart Disease Mother         Heart Disease, age 63 of myocardial infarction,     Diabetes Mother         Diabetes     Hypertension Mother         Hypertension     Thyroid Disease Mother         Thyroid Disease     Other - See Comments Father         Alzheimer's     Substance Abuse Maternal Uncle         Alcohol/Drug,Chemical dependency     Other - See Comments Son         Ankylosing spondylitis     Colon Cancer Brother         Cancer-colon,Colon cancer age 58     Prostate Cancer Brother         Cancer-prostate     Hypertension Brother         Hypertension     Hypertension Sister         Hypertension     Seizure Disorder Sister         Seizures     Other - See Comments Sister         Psychiatric illness,depression     Other - See Comments Maternal Aunt         Psychiatric illness       Social History:  Marital Status:   [5]  Social History     Tobacco Use     Smoking status: Former Smoker     Smokeless tobacco: Never Used   Substance Use Topics     Alcohol use: Not on file     Drug use:  Unknown     Types: Other     Comment: Drug use: Not Asked        Medications:    ALLOPURINOL PO  aspirin 81 MG EC tablet  atorvastatin (LIPITOR) 80 MG tablet  BUSPIRONE HCL PO  donepezil (ARICEPT) 5 MG tablet  furosemide (LASIX) 40 MG tablet  LEVOTHYROXINE SODIUM PO  MECLIZINE HCL PO  METOPROLOL SUCCINATE ER PO  multivitamin, therapeutic (THERA-VIT) TABS tablet  SERTRALINE HCL PO  traZODone (DESYREL) 50 MG tablet          Review of Systems   Unable to perform ROS: Dementia       Physical Exam   BP: 117/60  Heart Rate: 58  Temp: 97.5  F (36.4  C)  Resp: 16  SpO2: 99 %      Physical Exam  Constitutional:       General: She is not in acute distress.     Appearance: She is normal weight. She is not toxic-appearing.   HENT:      Head: Normocephalic.      Comments: 1-1/2 inch x 1 inch oval-shaped hematoma over right side of occiput  Neck:      Musculoskeletal: Normal range of motion and neck supple. No neck rigidity or muscular tenderness.   Cardiovascular:      Rate and Rhythm: Normal rate and regular rhythm.      Pulses: Normal pulses.   Pulmonary:      Effort: Pulmonary effort is normal.   Abdominal:      General: Abdomen is flat. There is no distension.      Tenderness: There is no abdominal tenderness. There is no right CVA tenderness, left CVA tenderness or guarding.   Musculoskeletal: Normal range of motion.   Skin:     General: Skin is warm.      Capillary Refill: Capillary refill takes less than 2 seconds.   Neurological:      General: No focal deficit present.      Mental Status: She is alert.   Psychiatric:         Mood and Affect: Mood normal.         Behavior: Behavior normal.         Thought Content: Thought content normal.         Judgment: Judgment normal.         ED Course        Procedures          EKG - sinus yessy with LVH       Results for orders placed or performed during the hospital encounter of 05/12/20 (from the past 24 hour(s))   CBC with platelets differential   Result Value Ref Range    WBC  7.4 4.0 - 11.0 10e9/L    RBC Count 4.40 3.8 - 5.2 10e12/L    Hemoglobin 13.3 11.7 - 15.7 g/dL    Hematocrit 40.3 35.0 - 47.0 %    MCV 92 78 - 100 fl    MCH 30.2 26.5 - 33.0 pg    MCHC 33.0 31.5 - 36.5 g/dL    RDW 14.0 10.0 - 15.0 %    Platelet Count 227 150 - 450 10e9/L    Diff Method Automated Method     % Neutrophils 61.8 %    % Lymphocytes 28.0 %    % Monocytes 7.7 %    % Eosinophils 1.4 %    % Basophils 0.7 %    % Immature Granulocytes 0.4 %    Nucleated RBCs 0 0 /100    Absolute Neutrophil 4.6 1.6 - 8.3 10e9/L    Absolute Lymphocytes 2.1 0.8 - 5.3 10e9/L    Absolute Monocytes 0.6 0.0 - 1.3 10e9/L    Absolute Eosinophils 0.1 0.0 - 0.7 10e9/L    Absolute Basophils 0.1 0.0 - 0.2 10e9/L    Abs Immature Granulocytes 0.0 0 - 0.4 10e9/L    Absolute Nucleated RBC 0.0    Basic metabolic panel   Result Value Ref Range    Sodium 138 133 - 144 mmol/L    Potassium 3.4 3.4 - 5.3 mmol/L    Chloride 102 94 - 109 mmol/L    Carbon Dioxide 31 20 - 32 mmol/L    Anion Gap 5 3 - 14 mmol/L    Glucose 105 (H) 70 - 99 mg/dL    Urea Nitrogen 19 7 - 30 mg/dL    Creatinine 1.49 (H) 0.52 - 1.04 mg/dL    GFR Estimate 34 (L) >60 mL/min/[1.73_m2]    GFR Estimate If Black 39 (L) >60 mL/min/[1.73_m2]    Calcium 9.8 8.5 - 10.1 mg/dL   INR   Result Value Ref Range    INR 0.98 0.86 - 1.14   Troponin I   Result Value Ref Range    Troponin I ES <0.015 0.000 - 0.045 ug/L   Alcohol ethyl   Result Value Ref Range    Ethanol g/dL <0.01 0.01 g/dL   Glucose by meter   Result Value Ref Range    Glucose 82 70 - 99 mg/dL   CT Head w/o Contrast    Narrative    PROCEDURE: CT HEAD W/O CONTRAST     HISTORY: Patient is status post fall with hematoma over occiput.  Rule  out intracranial hemorrhage versus skull fracture.    COMPARISON: None.    TECHNIQUE:  Helical images of the head from the foramen magnum to the  vertex were obtained without contrast.    FINDINGS: The ventricles and sulci are normal in volume. No acute  intracranial hemorrhage, mass effect,  midline shift, hydrocephalus or  basilar cystern effacement are present.    The grey-white matter interface is preserved.    The calvarium is intact. There is a scalp hematoma in the right  posterior parietal region. The mastoid air cells are clear.  The  visualized paranasal sinuses are clear.      Impression    IMPRESSION: No acute brain injury.      NAS POWERS MD   CT Cervical Spine w/o Contrast    Narrative    PROCEDURE: CT CERVICAL SPINE W/O CONTRAST 5/12/2020 1:24 PM    HISTORY: s/p fall with complaints of neck pain. R/o fx.    COMPARISONS: None.    Meds/Dose Given:    TECHNIQUE: CT scan of the cervical spine with sagittal coronal  reconstructions    FINDINGS: There are advanced degenerative changes at the middle  atlantoaxial joint. The C2-C3 C3-C4 discs appear normal. There is  forward subluxation of C4 on C5 due to facet joint degenerative  changes. There is decrease in height in the C5-C6 and C6-C7 discs with  mild anterior and posterior osteophytes. Cervical facet joint  degenerative changes are seen throughout the cervical spine most  severe at C3-C4 and C4-C5. There are no fractures of the vertebral  bodies or arches. Heavy assess chronic calcifications are seen at the  carotid bifurcations.         Impression    IMPRESSION: Advanced degenerative changes of the cervical spine    NAS POWERS MD   XR Chest 1 View    Narrative    PROCEDURE:  XR CHEST 1 VW    HISTORY:  Patient with complaints of fall.  Rule out pneumothorax or  infiltrate.     COMPARISON:  March 2019    FINDINGS:   The cardiac silhouette is normal in size. The pulmonary vasculature is  normal.  The lungs are clear. No pleural effusion or pneumothorax.      Impression    IMPRESSION:  No acute cardiopulmonary disease.      NAS POWERS MD       Medications - No data to display    Assessments & Plan (with Medical Decision Making)     74-year-old female who presents today after a fall at home.  It was unclear per patient's  description how she fell but she suffered a hematoma over the occiput of her head.  Labs as above and x-ray head and neck CT showed no acute process.    Close head injury instructions discussed with daughter-in-law and patient.  She is to be watched overnight today.  Patient is told to avoid any aspirin or any blood thinners today.  Patient to follow-up with PCP in 12 to 24 hours and return if she develops any new or worsening symptoms.    Due to the nature of this electronic medical record, laboratory results, imaging results, diagnosis, other information and medications reported above may not represent information available to me at the the time of my care and disposition. Medications reported above may have not been ordered by me.     Portions of the record may have been created with voice recognition software. Occasional wrong-word or 'sound-a- like' substitution may have occurred due to the inherent limitations of voice recognition software. Though the chart has been reviewed, there may be inadvertent transcription errors. Read the chart carefully and recognize, using context, where substitutions have occurred.       New Prescriptions    No medications on file       Final diagnoses:   Closed head injury, initial encounter   Fall, initial encounter       5/12/2020   [facility]     Bernice Zamora MD  05/12/20 2667

## 2020-05-12 NOTE — ED AVS SNAPSHOT
[facility]  [address] [address]  Phone:  [phone]                                    Vanesa Fleming   MRN: [id number]    Department:  [facility]   Date of Visit:  5/12/2020           After Visit Summary Signature Page    I have received my discharge instructions, and my questions have been answered. I have discussed any challenges I see with this plan with the nurse or doctor.    ..........................................................................................................................................  Patient/Patient Representative Signature      ..........................................................................................................................................  Patient Representative Print Name and Relationship to Patient    ..................................................               ................................................  Date                                   Time    ..........................................................................................................................................  Reviewed by Signature/Title    ...................................................              ..............................................  Date                                               Time          22EPIC Rev 08/18

## 2020-05-12 NOTE — ED TRIAGE NOTES
"Pt states she fell. Daughter states pt told her that she was walking out of her bedroom and started to fall forward \"and I turned my head so I wouldn't hit the front of my face\". Pt with swelling n back of head. Wrists intact. Pt with history of dementia and lives at the [facility]. Brought in by her daughter/medical POA. Pt denies any neck pain. On daily aspirin.  "

## 2020-05-12 NOTE — DISCHARGE INSTRUCTIONS
1) You must follow the closed head injury instructions provided  2) Follow up with your doctor in 12 to 24 hours.

## 2020-11-22 ENCOUNTER — HEALTH MAINTENANCE LETTER (OUTPATIENT)
Age: 75
End: 2020-11-22

## 2021-04-04 ENCOUNTER — HEALTH MAINTENANCE LETTER (OUTPATIENT)
Age: 76
End: 2021-04-04

## 2021-09-19 ENCOUNTER — HEALTH MAINTENANCE LETTER (OUTPATIENT)
Age: 76
End: 2021-09-19

## 2021-10-14 ENCOUNTER — APPOINTMENT (OUTPATIENT)
Dept: GENERAL RADIOLOGY | Facility: HOSPITAL | Age: 76
End: 2021-10-14
Attending: NURSE PRACTITIONER
Payer: MEDICARE

## 2021-10-14 ENCOUNTER — HOSPITAL ENCOUNTER (EMERGENCY)
Facility: HOSPITAL | Age: 76
Discharge: HOME | End: 2021-10-14
Attending: NURSE PRACTITIONER | Admitting: NURSE PRACTITIONER
Payer: MEDICARE

## 2021-10-14 VITALS
SYSTOLIC BLOOD PRESSURE: 150 MMHG | TEMPERATURE: 98.6 F | HEART RATE: 56 BPM | RESPIRATION RATE: 16 BRPM | OXYGEN SATURATION: 96 % | DIASTOLIC BLOOD PRESSURE: 74 MMHG

## 2021-10-14 DIAGNOSIS — S70.01XA HEMATOMA OF HIP, RIGHT, INITIAL ENCOUNTER: Primary | ICD-10-CM
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
## 2021-10-14 PROCEDURE — 73523 X-RAY EXAM HIPS BI 5/> VIEWS: CPT

## 2021-10-14 PROCEDURE — 99283 EMERGENCY DEPT VISIT LOW MDM: CPT

## 2021-10-14 PROCEDURE — 99283 EMERGENCY DEPT VISIT LOW MDM: CPT | Mod: 25

## 2021-10-14 PROCEDURE — 99282 EMERGENCY DEPT VISIT SF MDM: CPT | Performed by: NURSE PRACTITIONER

## 2021-10-14 RX ORDER — MEMANTINE HYDROCHLORIDE 5 MG/1
5 TABLET ORAL 2 TIMES DAILY
COMMUNITY

## 2021-10-14 RX ORDER — NAPROXEN SODIUM 220 MG
220 TABLET ORAL
COMMUNITY

## 2021-10-14 RX ORDER — ACETAMINOPHEN 325 MG/1
650 TABLET ORAL EVERY 4 HOURS PRN
Qty: 90 TABLET | Refills: 0 | Status: SHIPPED | OUTPATIENT
Start: 2021-10-14

## 2021-10-14 RX ORDER — LIDOCAINE 50 MG/G
1 PATCH TOPICAL EVERY 24 HOURS
Qty: 10 PATCH | Refills: 0 | Status: SHIPPED | OUTPATIENT
Start: 2021-10-14 | End: 2021-10-24

## 2021-10-14 NOTE — ED PROVIDER NOTES
History     Chief Complaint   Patient presents with     Fall     HPI     Vanesa Flemign is a 76 year old female who presents on stretcher via EMS from [place] accompanied by daughter for evaluation of fall and right buttock hematoma.  Per daughter she actually had a fall 1 week ago that was witnessed by staff and very similar to fall today.  Apparently she is trying to open a door and as she was pulling forward fell backwards onto her buttocks.  They noticed that hematoma from last week on right buttock has gotten larger.  They applied ice pack.  Patient started endorsing pain to area so they called the ambulance for her to come in for evaluation.  Patient denies headache, neck pain, back pain, chest pain, abdominal pain, hip pain.  There is no loss of consciousness.  She did not hit her head. The fall was witnessed by staff.  She denies any chest pain or dyspnea prior to or after falling.  Denies feeling lightheaded or dizzy prior to or after falling.    Denies any symptoms of recent illness.     Allergies:  No Known Allergies    Problem List:    Patient Active Problem List    Diagnosis Date Noted     ACP (advance care planning) 04/05/2017     Priority: Medium     Advance Care Planning 4/5/2017: Receipt of ACP document:  Received: Health Care Directive which was witnessed or notarized on [id number].  Document not previously scanned.  Reviewed for validity as medical order and sent to be scanned.  Code Status reflects choices in most recent ACP document..  Confirmed/documented designated decision maker(s).  Added by Eli Jaimes         Benign essential hypertension 01/24/2017     Priority: Medium     Mild recurrent major depression (H) 01/24/2017     Priority: Medium     Mild cognitive impairment with memory loss 01/24/2017     Priority: Medium     Elevated LDL cholesterol level 01/24/2017     Priority: Medium     CVA (cerebral vascular accident) (H) 01/24/2017     Priority: Medium     TIA (transient ischemic  attack) 2017     Priority: Medium        Past Medical History:    Past Medical History:   Diagnosis Date     Anxiety state      Diverticulosis of colon      Essential hypertension      Gout      Hypothyroidism      Malignant neoplasm of breast (female) (H)      Obesity      Other and unspecified hyperlipidemia      Other chronic nonalcoholic liver disease        Past Surgical History:    Past Surgical History:   Procedure Laterality Date     COLONOSCOPY      05     LAPAROSCOPY DIAGNOSTIC (GYN)      with tubal banding     OTHER SURGICAL HISTORY      02,[id number],BIOPSY BREAST,Left breast biopsy for a 3 centimeter left breast cancer     OTHER SURGICAL HISTORY      02,[id number],LYMPH NODE DISSECTION,Left lumpectomy with axillary node dissection     PHACOEMULSIFICATION WITH STANDARD INTRAOCULAR LENS IMPLANT Right 2018    Procedure: PHACOEMULSIFICATION WITH STANDARD INTRAOCULAR LENS IMPLANT;  CATARACT EXTRACTION RIGHT EYE WITH IMPLANT;  Surgeon: Andrews Oglesby MD;  Location: [facility]     PHACOEMULSIFICATION WITH STANDARD INTRAOCULAR LENS IMPLANT Left 2018    Procedure: PHACOEMULSIFICATION WITH STANDARD INTRAOCULAR LENS IMPLANT;  CATARACT EXTRACTION LEFT EYE WITH IMPLANT;  Surgeon: Andrews Oglesby MD;  Location: [facility]       Family History:    Family History   Problem Relation Age of Onset     Heart Disease Mother         Heart Disease, age 63 of myocardial infarction,     Diabetes Mother         Diabetes     Hypertension Mother         Hypertension     Thyroid Disease Mother         Thyroid Disease     Other - See Comments Father         Alzheimer's     Substance Abuse Maternal Uncle         Alcohol/Drug,Chemical dependency     Other - See Comments Son         Ankylosing spondylitis     Colon Cancer Brother         Cancer-colon,Colon cancer age 58     Prostate Cancer Brother         Cancer-prostate     Hypertension Brother         Hypertension     Hypertension Sister         Hypertension      Seizure Disorder Sister         Seizures     Other - See Comments Sister         Psychiatric illness,depression     Other - See Comments Maternal Aunt         Psychiatric illness       Social History:  Marital Status:   [5]  Social History     Tobacco Use     Smoking status: Former Smoker     Smokeless tobacco: Never Used   Substance Use Topics     Alcohol use: Not Currently     Drug use: Unknown     Types: Other     Comment: Drug use: Not Asked        Medications:    acetaminophen (TYLENOL) 325 MG tablet  ALLOPURINOL PO  aspirin 81 MG EC tablet  atorvastatin (LIPITOR) 80 MG tablet  BUSPIRONE HCL PO  donepezil (ARICEPT) 5 MG tablet  furosemide (LASIX) 40 MG tablet  LEVOTHYROXINE SODIUM PO  lidocaine (LIDODERM) 5 % patch  MECLIZINE HCL PO  memantine (NAMENDA) 5 MG tablet  METOPROLOL SUCCINATE ER PO  Multiple Vitamins-Minerals (PRESERVISION AREDS PO)  multivitamin, therapeutic (THERA-VIT) TABS tablet  naproxen sodium (ANAPROX) 220 MG tablet  SERTRALINE HCL PO  traZODone (DESYREL) 50 MG tablet          Review of Systems   Constitutional: Negative for chills and fever.   HENT: Negative for congestion, ear pain, rhinorrhea and sore throat.    Respiratory: Negative for cough and shortness of breath.    Cardiovascular: Negative for chest pain and palpitations.   Gastrointestinal: Negative for abdominal pain, nausea and vomiting.   Genitourinary: Negative for difficulty urinating and dysuria.   Musculoskeletal: Negative for arthralgias, back pain, myalgias and neck pain.   Skin: Positive for color change. Negative for wound.   Neurological: Negative for dizziness, light-headedness and headaches.       Physical Exam   BP: 164/89  Pulse: 63  Temp: 97.8  F (36.6  C)  Resp: 16  SpO2: 96 %      Physical Exam  Constitutional:       General: She is not in acute distress.     Appearance: Normal appearance. She is not ill-appearing or toxic-appearing.   HENT:      Head: Normocephalic and atraumatic.      Right Ear:  Tympanic membrane, ear canal and external ear normal. No hemotympanum.      Left Ear: Tympanic membrane, ear canal and external ear normal. No hemotympanum.      Nose: Nose normal. No signs of injury.      Mouth/Throat:      Lips: Pink.      Mouth: Mucous membranes are moist.      Pharynx: Oropharynx is clear. Uvula midline.   Eyes:      General: Lids are normal.      Extraocular Movements: Extraocular movements intact.      Conjunctiva/sclera: Conjunctivae normal.      Pupils: Pupils are equal, round, and reactive to light.   Cardiovascular:      Rate and Rhythm: Normal rate and regular rhythm.      Heart sounds: S1 normal and S2 normal.   Pulmonary:      Effort: Pulmonary effort is normal.      Breath sounds: Normal breath sounds.   Abdominal:      General: There is no distension.      Palpations: Abdomen is soft.      Tenderness: There is no abdominal tenderness. There is no right CVA tenderness, left CVA tenderness or guarding.   Musculoskeletal:      Cervical back: Normal and full passive range of motion without pain. No bony tenderness. No spinous process tenderness or muscular tenderness.      Thoracic back: Normal. No bony tenderness.      Lumbar back: Normal. No bony tenderness.        Legs:       Comments: FROM of upper extremities. No gross deformities.  FROM of lower extremities. No gross deformities   Skin:     General: Skin is warm and dry.      Coloration: Skin is not pale.   Neurological:      Mental Status: She is alert.      Cranial Nerves: Cranial nerves are intact.      Gait: Gait is intact.   Psychiatric:         Speech: Speech normal.         Behavior: Behavior is cooperative.         ED Course        Procedures           Results for orders placed or performed during the hospital encounter of 10/14/21 (from the past 24 hour(s))   XR Pelvis and Hip Bilateral 2 Views    Narrative    XR PELVIS AND HIP BILATERAL 2 VIEWS    HISTORY: 76 years Female fall, hematoma right gluteal    COMPARISON:  None    TECHNIQUE: 5 views pelvis and hips    FINDINGS: There is osteopenia. There is osteoarthritic change of both  hips. There is marginal osteophytic change of the femoral head neck  junction of both hips. There is no evidence of fracture or  dislocation.      Impression    IMPRESSION: Osteopenia and osteoarthritic changes without evidence of  hip fracture or dislocation.    JEFF NANCE MD         SYSTEM ID:  [id number]       Medications - No data to display    Assessments & Plan (with Medical Decision Making)     I have reviewed the nursing notes.    I have reviewed the findings, diagnosis, plan and need for follow up with the patient.  (S70.01XA) Hematoma of hip, right, initial encounter  (primary encounter diagnosis)  (W19.XXXA) Fall, initial encounter  Very pleasant, alert, well-appearing 76-year old female presents on stretcher via EMS for evaluation of hematoma to right buttock that occurred after fall approximately 1 week ago and has worsened after another fall today. She had no pre-syncopal symptoms prior to fall and fall was witnessed by staff who saw her opening a door and falling backwards landing on her buttocks. On Exam she has full ROM of upper and lower extremities. No focal neuro deficit. No bony vertebral tenderness. No hip or pelvic pain. Imaging is normal. Plan for discharge home with symptomatic treatments.      Lissett Bennett CNP    Discharge Medication List as of 10/14/2021  6:57 PM          Final diagnoses:   Hematoma of hip, right, initial encounter   Fall, initial encounter       10/14/2021   [facility]     Lissett Bennett CNP  10/18/21 [id number]

## 2021-10-14 NOTE — ED TRIAGE NOTES
"Patient presents via [place] ambulance.  Patient notes that she was trying to open her door, was not using her walker and fell backwards.  Patient notes that she landed on her buttocks and now has a large lump (1530).  Patient also notes that she had a fall last week as well.  Patient denies hitting her head and had no LOC.  Patient c/o a \"bump/lump\" to her right gluteal area.    "

## 2021-10-14 NOTE — DISCHARGE INSTRUCTIONS
(S70.01XA) Hematoma of hip, right, initial encounter  (primary encounter diagnosis)  (W19.XXXA) Fall, initial encounter  Very pleasant, alert, well-appearing 76-year old female presents on stretcher via EMS for evaluation of hematoma to right buttock that occurred after fall approximately 1 week ago and has worsened after another fall today. She had no pre-syncopal symptoms prior to fall and fall was witnessed by staff who saw her opening a door and falling backwards landing on her buttocks. On Exam she has full ROM of upper and lower extremities. No focal neuro deficit. No bony vertebral tenderness. No hip or pelvic pain. Imaging is normal. Plan for discharge home with symptomatic treatments.  Recommend:  - acetaminophen (Tylenol) 650 mg every 4-6 hours as needed for pain for a maximum of 4,000 mg daily  - Ice and/or heat to right hip for discomfort  - Lidocaine patch on skin for pain        RETURN TO THE ED WITH NEW OR WORSENING SYMPTOMS.    ED FOLLOW-UP WITH YOUR PRIMARY CARE PROVIDER IN 5-7 DAYS.      Lissett Bennett CNP      Results for orders placed or performed during the hospital encounter of 10/14/21   XR Pelvis and Hip Bilateral 2 Views     Status: None    Narrative    XR PELVIS AND HIP BILATERAL 2 VIEWS    HISTORY: 76 years Female fall, hematoma right gluteal    COMPARISON: None    TECHNIQUE: 5 views pelvis and hips    FINDINGS: There is osteopenia. There is osteoarthritic change of both  hips. There is marginal osteophytic change of the femoral head neck  junction of both hips. There is no evidence of fracture or  dislocation.      Impression    IMPRESSION: Osteopenia and osteoarthritic changes without evidence of  hip fracture or dislocation.    JEFF NANCE MD         SYSTEM ID:  [id number]

## 2021-10-14 NOTE — ED NOTES
Pt is able to stand at bedside and change into gown w SBA. Pt is strong and equal x 4.   Poor historian of events, daughter at bedside states she does have alzheimer.

## 2021-10-15 ASSESSMENT — ENCOUNTER SYMPTOMS
WOUND: 0
DYSURIA: 0
HEADACHES: 0
SHORTNESS OF BREATH: 0
DIFFICULTY URINATING: 0
FEVER: 0
PALPITATIONS: 0
COLOR CHANGE: 1
LIGHT-HEADEDNESS: 0
RHINORRHEA: 0
ABDOMINAL PAIN: 0
CHILLS: 0
VOMITING: 0
NAUSEA: 0
SORE THROAT: 0
MYALGIAS: 0
COUGH: 0
BACK PAIN: 0
ARTHRALGIAS: 0
NECK PAIN: 0
DIZZINESS: 0

## 2021-10-15 NOTE — ED NOTES
Patient and family given discharge instructions to take tylenol and lidocaine patch for pain.     Patient verbalized understanding. Patient condition improved upon discharge.

## 2022-05-01 ENCOUNTER — HEALTH MAINTENANCE LETTER (OUTPATIENT)
Age: 77
End: 2022-05-01

## 2022-11-20 ENCOUNTER — HEALTH MAINTENANCE LETTER (OUTPATIENT)
Age: 77
End: 2022-11-20

## 2023-01-22 ENCOUNTER — APPOINTMENT (OUTPATIENT)
Dept: CT IMAGING | Facility: HOSPITAL | Age: 78
End: 2023-01-22
Attending: FAMILY MEDICINE
Payer: COMMERCIAL

## 2023-01-22 ENCOUNTER — APPOINTMENT (OUTPATIENT)
Dept: GENERAL RADIOLOGY | Facility: HOSPITAL | Age: 78
End: 2023-01-22
Attending: FAMILY MEDICINE
Payer: COMMERCIAL

## 2023-01-22 ENCOUNTER — HOSPITAL ENCOUNTER (EMERGENCY)
Facility: HOSPITAL | Age: 78
Discharge: HOME | End: 2023-01-22
Attending: FAMILY MEDICINE | Admitting: FAMILY MEDICINE
Payer: COMMERCIAL

## 2023-01-22 VITALS
RESPIRATION RATE: 16 BRPM | HEART RATE: 63 BPM | OXYGEN SATURATION: 96 % | DIASTOLIC BLOOD PRESSURE: 97 MMHG | TEMPERATURE: 98.1 F | SYSTOLIC BLOOD PRESSURE: 140 MMHG

## 2023-01-22 DIAGNOSIS — S32.9XXA CLOSED NONDISPLACED FRACTURE OF PELVIS, UNSPECIFIED PART OF PELVIS, INITIAL ENCOUNTER (H): ICD-10-CM

## 2023-01-22 LAB
ANION GAP SERPL CALCULATED.3IONS-SCNC: 8 MMOL/L (ref 7–15)
BASOPHILS # BLD AUTO: 0 10E3/UL (ref 0–0.2)
BASOPHILS NFR BLD AUTO: 0 %
BUN SERPL-MCNC: 18.4 MG/DL (ref 8–23)
CALCIUM SERPL-MCNC: 9.6 MG/DL (ref 8.8–10.2)
CHLORIDE SERPL-SCNC: 100 MMOL/L (ref 98–107)
CREAT SERPL-MCNC: 1.07 MG/DL (ref 0.51–0.95)
CRP SERPL-MCNC: 10.53 MG/L
DEPRECATED HCO3 PLAS-SCNC: 30 MMOL/L (ref 22–29)
EOSINOPHIL # BLD AUTO: 0 10E3/UL (ref 0–0.7)
EOSINOPHIL NFR BLD AUTO: 0 %
ERYTHROCYTE [DISTWIDTH] IN BLOOD BY AUTOMATED COUNT: 14.3 % (ref 10–15)
ERYTHROCYTE [SEDIMENTATION RATE] IN BLOOD BY WESTERGREN METHOD: 12 MM/HR (ref 0–30)
GFR SERPL CREATININE-BSD FRML MDRD: 53 ML/MIN/1.73M2
GLUCOSE SERPL-MCNC: 90 MG/DL (ref 70–99)
HCT VFR BLD AUTO: 38.2 % (ref 35–47)
HGB BLD-MCNC: 12.8 G/DL (ref 11.7–15.7)
HOLD SPECIMEN: NORMAL
IMM GRANULOCYTES # BLD: 0 10E3/UL
IMM GRANULOCYTES NFR BLD: 0 %
LYMPHOCYTES # BLD AUTO: 1.3 10E3/UL (ref 0.8–5.3)
LYMPHOCYTES NFR BLD AUTO: 13 %
MCH RBC QN AUTO: 31.3 PG (ref 26.5–33)
MCHC RBC AUTO-ENTMCNC: 33.5 G/DL (ref 31.5–36.5)
MCV RBC AUTO: 93 FL (ref 78–100)
MONOCYTES # BLD AUTO: 0.7 10E3/UL (ref 0–1.3)
MONOCYTES NFR BLD AUTO: 8 %
NEUTROPHILS # BLD AUTO: 7.4 10E3/UL (ref 1.6–8.3)
NEUTROPHILS NFR BLD AUTO: 79 %
NRBC # BLD AUTO: 0 10E3/UL
NRBC BLD AUTO-RTO: 0 /100
PLATELET # BLD AUTO: 174 10E3/UL (ref 150–450)
POTASSIUM SERPL-SCNC: 4 MMOL/L (ref 3.4–5.3)
RBC # BLD AUTO: 4.09 10E6/UL (ref 3.8–5.2)
SODIUM SERPL-SCNC: 138 MMOL/L (ref 136–145)
WBC # BLD AUTO: 9.5 10E3/UL (ref 4–11)

## 2023-01-22 PROCEDURE — 86140 C-REACTIVE PROTEIN: CPT | Performed by: FAMILY MEDICINE

## 2023-01-22 PROCEDURE — 73502 X-RAY EXAM HIP UNI 2-3 VIEWS: CPT

## 2023-01-22 PROCEDURE — 85025 COMPLETE CBC W/AUTO DIFF WBC: CPT | Performed by: FAMILY MEDICINE

## 2023-01-22 PROCEDURE — 36415 COLL VENOUS BLD VENIPUNCTURE: CPT | Performed by: FAMILY MEDICINE

## 2023-01-22 PROCEDURE — 80048 BASIC METABOLIC PNL TOTAL CA: CPT | Performed by: FAMILY MEDICINE

## 2023-01-22 PROCEDURE — 85652 RBC SED RATE AUTOMATED: CPT | Performed by: FAMILY MEDICINE

## 2023-01-22 PROCEDURE — 72192 CT PELVIS W/O DYE: CPT | Mod: MF

## 2023-01-22 PROCEDURE — 99284 EMERGENCY DEPT VISIT MOD MDM: CPT | Performed by: FAMILY MEDICINE

## 2023-01-22 PROCEDURE — 99285 EMERGENCY DEPT VISIT HI MDM: CPT | Mod: 25

## 2023-01-22 ASSESSMENT — ACTIVITIES OF DAILY LIVING (ADL)
ADLS_ACUITY_SCORE: 35
ADLS_ACUITY_SCORE: 35

## 2023-01-22 NOTE — ED NOTES
Pt discharging to home with family, weight bearing as needed, follow up with ortho and PCP, tylenol for pain. Pt's daughter and daughter-in-law at bedside for discharge teaching. Family to update [facility].

## 2023-01-22 NOTE — DISCHARGE INSTRUCTIONS
Rest and stay well-hydrated  Tylenol as needed for pain and discomfort  Weightbearing as tolerated  Follow-up with orthopedic in 3 weeks  Close follow-up with PCP  Come back for any concern or any worsening symptoms

## 2023-01-22 NOTE — ED TRIAGE NOTES
Pt presents from [facility] with c/o left leg pain.  Per EMS, pt's daughter was visiting this moring and noticed pt was not putting weight on her left leg. Pt has hx dementia, denies recent injury, points to her hip when asked about pain. Inspection of skin at this area does not show any bruising, redness, or abrasion. Pt oriented to self and pleasant.  Pt reports leg pain started 2 days ago.

## 2023-06-02 ENCOUNTER — HEALTH MAINTENANCE LETTER (OUTPATIENT)
Age: 78
End: 2023-06-02

## 2024-02-03 ENCOUNTER — HEALTH MAINTENANCE LETTER (OUTPATIENT)
Age: 79
End: 2024-02-03

## 2024-06-18 ENCOUNTER — HOSPITAL ENCOUNTER (EMERGENCY)
Facility: HOSPITAL | Age: 79
Discharge: HOME | End: 2024-06-18
Attending: NURSE PRACTITIONER | Admitting: NURSE PRACTITIONER
Payer: COMMERCIAL

## 2024-06-18 ENCOUNTER — APPOINTMENT (OUTPATIENT)
Dept: CT IMAGING | Facility: HOSPITAL | Age: 79
End: 2024-06-18
Attending: NURSE PRACTITIONER
Payer: COMMERCIAL

## 2024-06-18 VITALS
TEMPERATURE: 96.9 F | WEIGHT: 115.96 LBS | RESPIRATION RATE: 18 BRPM | DIASTOLIC BLOOD PRESSURE: 75 MMHG | BODY MASS INDEX: 19.9 KG/M2 | OXYGEN SATURATION: 95 % | SYSTOLIC BLOOD PRESSURE: 138 MMHG | HEART RATE: 58 BPM

## 2024-06-18 DIAGNOSIS — N39.0 UTI (URINARY TRACT INFECTION): ICD-10-CM

## 2024-06-18 DIAGNOSIS — R55 SYNCOPE: ICD-10-CM

## 2024-06-18 DIAGNOSIS — R79.89 ELEVATED TROPONIN: ICD-10-CM

## 2024-06-18 PROBLEM — I69.919 COGNITIVE DEFICITS FOLLOWING CEREBROVASCULAR DISEASE: Status: ACTIVE | Noted: 2018-03-20

## 2024-06-18 PROBLEM — K57.30 DIVERTICULAR DISEASE OF COLON: Status: ACTIVE | Noted: 2024-06-18

## 2024-06-18 PROBLEM — M89.9 DISORDER OF BONE AND CARTILAGE: Status: ACTIVE | Noted: 2024-06-18

## 2024-06-18 PROBLEM — E03.9 HYPOTHYROIDISM: Status: ACTIVE | Noted: 2024-06-18

## 2024-06-18 PROBLEM — I10 HYPERTENSION: Status: ACTIVE | Noted: 2024-06-18

## 2024-06-18 PROBLEM — F41.1 GENERALIZED ANXIETY DISORDER: Status: ACTIVE | Noted: 2024-06-18

## 2024-06-18 PROBLEM — M94.9 DISORDER OF BONE AND CARTILAGE: Status: ACTIVE | Noted: 2024-06-18

## 2024-06-18 LAB
ALBUMIN SERPL BCG-MCNC: 4.3 G/DL (ref 3.5–5.2)
ALBUMIN UR-MCNC: NEGATIVE MG/DL
ALP SERPL-CCNC: 126 U/L (ref 40–150)
ALT SERPL W P-5'-P-CCNC: 34 U/L (ref 0–50)
ANION GAP SERPL CALCULATED.3IONS-SCNC: 8 MMOL/L (ref 7–15)
APPEARANCE UR: ABNORMAL
AST SERPL W P-5'-P-CCNC: 40 U/L (ref 0–45)
BACTERIA #/AREA URNS HPF: ABNORMAL /HPF
BASOPHILS # BLD AUTO: 0 10E3/UL (ref 0–0.2)
BASOPHILS NFR BLD AUTO: 0 %
BILIRUB SERPL-MCNC: 0.5 MG/DL
BILIRUB UR QL STRIP: NEGATIVE
BUN SERPL-MCNC: 16.2 MG/DL (ref 8–23)
CALCIUM SERPL-MCNC: 9.3 MG/DL (ref 8.8–10.2)
CHLORIDE SERPL-SCNC: 96 MMOL/L (ref 98–107)
COLOR UR AUTO: YELLOW
CREAT SERPL-MCNC: 1.14 MG/DL (ref 0.51–0.95)
DEPRECATED HCO3 PLAS-SCNC: 31 MMOL/L (ref 22–29)
EGFRCR SERPLBLD CKD-EPI 2021: 48 ML/MIN/1.73M2
EOSINOPHIL # BLD AUTO: 0 10E3/UL (ref 0–0.7)
EOSINOPHIL NFR BLD AUTO: 0 %
ERYTHROCYTE [DISTWIDTH] IN BLOOD BY AUTOMATED COUNT: 14.7 % (ref 10–15)
GLUCOSE SERPL-MCNC: 154 MG/DL (ref 70–99)
GLUCOSE UR STRIP-MCNC: NEGATIVE MG/DL
HCT VFR BLD AUTO: 39.4 % (ref 35–47)
HGB BLD-MCNC: 12.7 G/DL (ref 11.7–15.7)
HGB UR QL STRIP: ABNORMAL
HYALINE CASTS: 4 /LPF
IMM GRANULOCYTES # BLD: 0.1 10E3/UL
IMM GRANULOCYTES NFR BLD: 1 %
KETONES UR STRIP-MCNC: NEGATIVE MG/DL
LEUKOCYTE ESTERASE UR QL STRIP: ABNORMAL
LYMPHOCYTES # BLD AUTO: 1.4 10E3/UL (ref 0.8–5.3)
LYMPHOCYTES NFR BLD AUTO: 15 %
MAGNESIUM SERPL-MCNC: 1.9 MG/DL (ref 1.7–2.3)
MCH RBC QN AUTO: 30.6 PG (ref 26.5–33)
MCHC RBC AUTO-ENTMCNC: 32.2 G/DL (ref 31.5–36.5)
MCV RBC AUTO: 95 FL (ref 78–100)
MONOCYTES # BLD AUTO: 0.5 10E3/UL (ref 0–1.3)
MONOCYTES NFR BLD AUTO: 6 %
MUCOUS THREADS #/AREA URNS LPF: PRESENT /LPF
NEUTROPHILS # BLD AUTO: 7.3 10E3/UL (ref 1.6–8.3)
NEUTROPHILS NFR BLD AUTO: 78 %
NITRATE UR QL: POSITIVE
NRBC # BLD AUTO: 0 10E3/UL
NRBC BLD AUTO-RTO: 0 /100
PH UR STRIP: 6.5 [PH] (ref 4.7–8)
PLATELET # BLD AUTO: 214 10E3/UL (ref 150–450)
POTASSIUM SERPL-SCNC: 3.6 MMOL/L (ref 3.4–5.3)
PROT SERPL-MCNC: 7 G/DL (ref 6.4–8.3)
RBC # BLD AUTO: 4.15 10E6/UL (ref 3.8–5.2)
RBC URINE: 16 /HPF
SODIUM SERPL-SCNC: 135 MMOL/L (ref 135–145)
SP GR UR STRIP: 1.01 (ref 1–1.03)
SQUAMOUS EPITHELIAL: 0 /HPF
TROPONIN T SERPL HS-MCNC: 23 NG/L
TROPONIN T SERPL HS-MCNC: 24 NG/L
UROBILINOGEN UR STRIP-MCNC: NORMAL MG/DL
WBC # BLD AUTO: 9.4 10E3/UL (ref 4–11)
WBC CLUMPS #/AREA URNS HPF: PRESENT /HPF
WBC URINE: 98 /HPF

## 2024-06-18 PROCEDURE — 84484 ASSAY OF TROPONIN QUANT: CPT | Performed by: NURSE PRACTITIONER

## 2024-06-18 PROCEDURE — 82247 BILIRUBIN TOTAL: CPT | Performed by: NURSE PRACTITIONER

## 2024-06-18 PROCEDURE — 250N000011 HC RX IP 250 OP 636: Mod: JZ | Performed by: NURSE PRACTITIONER

## 2024-06-18 PROCEDURE — 99285 EMERGENCY DEPT VISIT HI MDM: CPT | Mod: 25

## 2024-06-18 PROCEDURE — 70450 CT HEAD/BRAIN W/O DYE: CPT

## 2024-06-18 PROCEDURE — 93005 ELECTROCARDIOGRAM TRACING: CPT

## 2024-06-18 PROCEDURE — 87086 URINE CULTURE/COLONY COUNT: CPT | Performed by: NURSE PRACTITIONER

## 2024-06-18 PROCEDURE — 83735 ASSAY OF MAGNESIUM: CPT | Performed by: NURSE PRACTITIONER

## 2024-06-18 PROCEDURE — 36415 COLL VENOUS BLD VENIPUNCTURE: CPT | Performed by: NURSE PRACTITIONER

## 2024-06-18 PROCEDURE — 93010 ELECTROCARDIOGRAM REPORT: CPT | Performed by: INTERNAL MEDICINE

## 2024-06-18 PROCEDURE — 87186 SC STD MICRODIL/AGAR DIL: CPT | Performed by: NURSE PRACTITIONER

## 2024-06-18 PROCEDURE — 81001 URINALYSIS AUTO W/SCOPE: CPT | Performed by: NURSE PRACTITIONER

## 2024-06-18 PROCEDURE — 99284 EMERGENCY DEPT VISIT MOD MDM: CPT | Performed by: NURSE PRACTITIONER

## 2024-06-18 PROCEDURE — 96372 THER/PROPH/DIAG INJ SC/IM: CPT | Performed by: NURSE PRACTITIONER

## 2024-06-18 PROCEDURE — 85025 COMPLETE CBC W/AUTO DIFF WBC: CPT | Performed by: NURSE PRACTITIONER

## 2024-06-18 RX ORDER — CEPHALEXIN 500 MG/1
500 CAPSULE ORAL 2 TIMES DAILY
Qty: 14 CAPSULE | Refills: 0 | Status: SHIPPED | OUTPATIENT
Start: 2024-06-18 | End: 2024-06-25

## 2024-06-18 RX ORDER — CEFTRIAXONE SODIUM 1 G
1 VIAL (EA) INJECTION ONCE
Status: COMPLETED | OUTPATIENT
Start: 2024-06-18 | End: 2024-06-18

## 2024-06-18 RX ADMIN — CEFTRIAXONE 1 G: 1 INJECTION, POWDER, FOR SOLUTION INTRAMUSCULAR; INTRAVENOUS at 20:40

## 2024-06-18 ASSESSMENT — COLUMBIA-SUICIDE SEVERITY RATING SCALE - C-SSRS
1. IN THE PAST MONTH, HAVE YOU WISHED YOU WERE DEAD OR WISHED YOU COULD GO TO SLEEP AND NOT WAKE UP?: NO
6. HAVE YOU EVER DONE ANYTHING, STARTED TO DO ANYTHING, OR PREPARED TO DO ANYTHING TO END YOUR LIFE?: NO
2. HAVE YOU ACTUALLY HAD ANY THOUGHTS OF KILLING YOURSELF IN THE PAST MONTH?: NO

## 2024-06-18 ASSESSMENT — ACTIVITIES OF DAILY LIVING (ADL)
ADLS_ACUITY_SCORE: 36

## 2024-06-18 NOTE — ED PROVIDER NOTES
History     Chief Complaint   Patient presents with    Loss of Consciousness     HPI  Vanesa Fleming is a 79 year old individual with history of major depression, cognitive impairment/dementia, TIA/CVA, hypothyroidism, hypertension, comes in for syncope.  Patient apparently had syncope at the nursing home.  No trauma reported.  Sent in for evaluation.  Unable to get any information from patient on arrival.  Denies any issues at all.  No complaints of pain, headache, visual disturbances, shortness of breath, neck pain or back pain.    Allergies:  No Known Allergies    Problem List:    Patient Active Problem List    Diagnosis Date Noted    Diverticular disease of colon 06/18/2024     Priority: Medium     Formatting of this note might be different from the original.   Sigmoid.      Disorder of bone and cartilage 06/18/2024     Priority: Medium    Generalized anxiety disorder 06/18/2024     Priority: Medium    Hypertension 06/18/2024     Priority: Medium    Hypothyroidism 06/18/2024     Priority: Medium    Cognitive deficits following cerebrovascular disease 03/20/2018     Priority: Medium    ACP (advance care planning) 04/05/2017     Priority: Medium     Advance Care Planning 4/5/2017: Receipt of ACP document:  Received: Health Care Directive which was witnessed or notarized on [id number].  Document not previously scanned.  Reviewed for validity as medical order and sent to be scanned.  Code Status reflects choices in most recent ACP document..  Confirmed/documented designated decision maker(s).  Added by Eli Jaimes        Benign essential hypertension 01/24/2017     Priority: Medium    Mild recurrent major depression (H24) 01/24/2017     Priority: Medium    Mild cognitive impairment with memory loss 01/24/2017     Priority: Medium    Elevated LDL cholesterol level 01/24/2017     Priority: Medium    CVA (cerebral vascular accident) (H) 01/24/2017     Priority: Medium    TIA (transient ischemic attack) 01/23/2017      Priority: Medium        Past Medical History:    Past Medical History:   Diagnosis Date    Anxiety state     Diverticulosis of colon     Essential hypertension     Gout     Hypothyroidism     Malignant neoplasm of breast (female) (H)     Obesity     Other and unspecified hyperlipidemia     Other chronic nonalcoholic liver disease        Past Surgical History:    Past Surgical History:   Procedure Laterality Date    COLONOSCOPY      05    LAPAROSCOPY DIAGNOSTIC (GYN)      with tubal banding    OTHER SURGICAL HISTORY      02,[id number],BIOPSY BREAST,Left breast biopsy for a 3 centimeter left breast cancer    OTHER SURGICAL HISTORY      02,[id number],LYMPH NODE DISSECTION,Left lumpectomy with axillary node dissection    PHACOEMULSIFICATION WITH STANDARD INTRAOCULAR LENS IMPLANT Right 2018    Procedure: PHACOEMULSIFICATION WITH STANDARD INTRAOCULAR LENS IMPLANT;  CATARACT EXTRACTION RIGHT EYE WITH IMPLANT;  Surgeon: Andrews Oglesby MD;  Location: [facility]    PHACOEMULSIFICATION WITH STANDARD INTRAOCULAR LENS IMPLANT Left 2018    Procedure: PHACOEMULSIFICATION WITH STANDARD INTRAOCULAR LENS IMPLANT;  CATARACT EXTRACTION LEFT EYE WITH IMPLANT;  Surgeon: Andrews Oglesby MD;  Location: [facility]       Family History:    Family History   Problem Relation Age of Onset    Heart Disease Mother         Heart Disease, age 63 of myocardial infarction,    Diabetes Mother         Diabetes    Hypertension Mother         Hypertension    Thyroid Disease Mother         Thyroid Disease    Other - See Comments Father         Alzheimer's    Substance Abuse Maternal Uncle         Alcohol/Drug,Chemical dependency    Other - See Comments Son         Ankylosing spondylitis    Colon Cancer Brother         Cancer-colon,Colon cancer age 58    Prostate Cancer Brother         Cancer-prostate    Hypertension Brother         Hypertension    Hypertension Sister         Hypertension    Seizure Disorder Sister         Seizures     Other - See Comments Sister         Psychiatric illness,depression    Other - See Comments Maternal Aunt         Psychiatric illness       Social History:  Marital Status:   [5]  Social History     Tobacco Use    Smoking status: Former    Smokeless tobacco: Never   Substance Use Topics    Alcohol use: Not Currently    Drug use: Unknown     Types: Other     Comment: Drug use: Not Asked        Medications:    cephALEXin (KEFLEX) 500 MG capsule  acetaminophen (TYLENOL) 325 MG tablet  ALLOPURINOL PO  aspirin 81 MG EC tablet  atorvastatin (LIPITOR) 80 MG tablet  BUSPIRONE HCL PO  donepezil (ARICEPT) 5 MG tablet  furosemide (LASIX) 40 MG tablet  LEVOTHYROXINE SODIUM PO  MECLIZINE HCL PO  memantine (NAMENDA) 5 MG tablet  METOPROLOL SUCCINATE ER PO  Multiple Vitamins-Minerals (PRESERVISION AREDS PO)  multivitamin, therapeutic (THERA-VIT) TABS tablet  naproxen sodium (ANAPROX) 220 MG tablet  SERTRALINE HCL PO  traZODone (DESYREL) 50 MG tablet          Review of Systems   Unable to perform ROS: Dementia       Physical Exam   BP: 140/82  Pulse: 59  Temp: 96.9  F (36.1  C)  Resp: 18  Weight: 52.6 kg (115 lb 15.4 oz)  SpO2: 96 %  Lying Orthostatic BP: 122/69  Lying Orthostatic Pulse: 51 bpm  Sitting Orthostatic BP: 130/68  Sitting Orthostatic Pulse: 61 bpm  Standing Orthostatic BP: 138/75  Standing Orthostatic Pulse: 64 bpm      GENERAL APPEARANCE:  The patient is a 79 year old well-developed, well-nourished individual in no acute distress that appears as stated age.  HEENT:  Normocephalic.  No soft spots, indentations, tenderness noted upon palpation of the scalp or face.  No crepitus or deformities noted to face or scalp.  Pupils are equal, round, and reactive to light.  Oropharynx is clear.  No avulsed teeth, buccal or tongue lacerations present.  Voice is clear and without muffling.   No otorrhea or rhinorrhea present.  Negative oliveros sign.  Negative for hemotympanum bilaterally.  NECK:  Supple.  Trachea is  midline.  No carotid bruits present on auscultation.  CHEST:  Symmetric.  Non-tender to palpation.  No crepitus or deformity.  LUNGS:  Breathing is easy.  Breath sounds are equal and clear bilaterally.  No wheezes, rhonchi, or rales.  HEART:  Regular rate and rhythm with normal S1 and S2.  No murmurs, gallops, or rubs.  ABDOMEN:  Soft.  No mass, tenderness, guarding, or rebound.  No organomegaly or hernia.  Bowel sounds are present.  No CVA tenderness or flank mass.  No abdominal bruits or thrills present upon auscultation/palpation.  Negative Avni sign.  Negative Ventura Goldstein sign.  EXTREMITIES:  No cyanosis, clubbing, or edema.  Pedal and post-tibial pulses are 2+ bilaterally.  Radial pulses are 2+ bilaterally.  MUSCULOSKELETAL:  Normal gait and station.  No cervical/thoracic/lumbar spinal tenderness, step-offs, deformities noted to palpation.  No paraspinal tenderness noted to palpation.  Pelvis stable upon palpation.  No crepitus, deformities, tenderness noted to palpation to the upper or lower extremities to palpation.  Range of motion intact to all joints.  Strength equal bilaterally.  MENTAL STATUS:   The patient was alert but disoriented to time, place, situation. Registration and recall not intact. No difficulty with concentration.  Spontaneous eye opening.  Follows commands.  GCS 14.   CRANIAL NERVES:  PERRL. EOMI; no nystagmus.  Full visual fields.  Trapezius and sternocleidomastoid are full strength. Tongue was midline and protrudes midline. Uvula was midline and raises midline. Facial sensation was intact to pain and light touch at all distributions. No speech disturbance. Hearing intact to conversation and whisper.  No facial asymmetry.  SENSORY:  Sensation intact.  PSYCHIATRIC: Appropriate mood and affect.  Calm and cooperative with history and physical exam.  SKIN:  Warm, dry, and well perfused.  Good turgor.  No lesions, nodules, or rashes are noted.  No bruising noted.       Comment: Discrepancies  between my note and notes on behalf of the nursing team or other care providers are secondary to my findings reflecting my physical examination and questioning of the patient.  Any conflicting information provided is not in line with my examination of the patient.       ED Course     ED Course as of 06/18/24 2048 Tue Jun 18, 2024   1741 Labs, ECG, orthostatic vital signs ordered.   1743 In to see patient and history/physical completed.    1751 CT head without contrast ordered.   1830 Troponin T, High Sensitivity(!): 23  High-sensitivity troponin is elevated at 23.  2-hour repeat ordered for 2000.   1831 Comprehensive metabolic panel(!)  BUN 16.2 with a creatinine 1.14 and a GFR 48 which is at patient baseline looking at old lab work.   2004 UA with Microscopic reflex to Culture(!)  UA positive for nitrates, large leukocyte esterase, many bacteria, 98 WBCs with 0 squamous epithelial cells.  Ceftriaxone 1 g IM ordered.   2026 Patient does have troponin that did elevate from 23-24 that is high-sensitivity.  Offered family 4-hour repeat but this was deferred due to age and dementia.  Also offered Zio patch for arrhythmias due to syncope.  This was also deferred.  At this point POA wants patient to go back to the nursing home.  Will discharge on cephalexin 500 mg twice daily x 7 days.  Close follow-up with PCP.  Advised can return if any concerns.          ECG:    ECG competed at 1801 and personally reviewed at 1805 showing sinus rhythm with ventricular rate of 60 and QTc of 484.  Left axis deviation present.  Voltage criteria for LVH is present.  Abnormal ECG.  When compared to ECG from 5/12/2020, no significant changes noted.         Results for orders placed or performed during the hospital encounter of 06/18/24 (from the past 24 hour(s))   CBC with platelets differential    Narrative    The following orders were created for panel order CBC with platelets differential.  Procedure                                Abnormality         Status                     ---------                               -----------         ------                     CBC with platelets and d...[[id number]]                      Final result                 Please view results for these tests on the individual orders.   Comprehensive metabolic panel   Result Value Ref Range    Sodium 135 135 - 145 mmol/L    Potassium 3.6 3.4 - 5.3 mmol/L    Carbon Dioxide (CO2) 31 (H) 22 - 29 mmol/L    Anion Gap 8 7 - 15 mmol/L    Urea Nitrogen 16.2 8.0 - 23.0 mg/dL    Creatinine 1.14 (H) 0.51 - 0.95 mg/dL    GFR Estimate 48 (L) >60 mL/min/1.73m2    Calcium 9.3 8.8 - 10.2 mg/dL    Chloride 96 (L) 98 - 107 mmol/L    Glucose 154 (H) 70 - 99 mg/dL    Alkaline Phosphatase 126 40 - 150 U/L    AST 40 0 - 45 U/L    ALT 34 0 - 50 U/L    Protein Total 7.0 6.4 - 8.3 g/dL    Albumin 4.3 3.5 - 5.2 g/dL    Bilirubin Total 0.5 <=1.2 mg/dL   Troponin T, High Sensitivity   Result Value Ref Range    Troponin T, High Sensitivity 23 (H) <=14 ng/L   Magnesium   Result Value Ref Range    Magnesium 1.9 1.7 - 2.3 mg/dL   CBC with platelets and differential   Result Value Ref Range    WBC Count 9.4 4.0 - 11.0 10e3/uL    RBC Count 4.15 3.80 - 5.20 10e6/uL    Hemoglobin 12.7 11.7 - 15.7 g/dL    Hematocrit 39.4 35.0 - 47.0 %    MCV 95 78 - 100 fL    MCH 30.6 26.5 - 33.0 pg    MCHC 32.2 31.5 - 36.5 g/dL    RDW 14.7 10.0 - 15.0 %    Platelet Count 214 150 - 450 10e3/uL    % Neutrophils 78 %    % Lymphocytes 15 %    % Monocytes 6 %    % Eosinophils 0 %    % Basophils 0 %    % Immature Granulocytes 1 %    NRBCs per 100 WBC 0 <1 /100    Absolute Neutrophils 7.3 1.6 - 8.3 10e3/uL    Absolute Lymphocytes 1.4 0.8 - 5.3 10e3/uL    Absolute Monocytes 0.5 0.0 - 1.3 10e3/uL    Absolute Eosinophils 0.0 0.0 - 0.7 10e3/uL    Absolute Basophils 0.0 0.0 - 0.2 10e3/uL    Absolute Immature Granulocytes 0.1 <=0.4 10e3/uL    Absolute NRBCs 0.0 10e3/uL   EKG 12-lead, tracing only   Result Value Ref Range     Systolic Blood Pressure  mmHg    Diastolic Blood Pressure  mmHg    Ventricular Rate 60 BPM    Atrial Rate 60 BPM    KY Interval 150 ms    QRS Duration 110 ms     ms    QTc 484 ms    P Axis 51 degrees    R AXIS -21 degrees    T Axis 42 degrees    Interpretation ECG       Sinus rhythm  Moderate voltage criteria for LVH, may be normal variant ( R in aVL , [place] product )  Borderline ECG  No previous ECGs available     Head CT w/o contrast    Narrative    Exam: CT HEAD W/O CONTRAST    Clinical history:79 years Female Syncope    Comparisons: 5/12/2020    Technique: Axial CT imaging of the head was performed Without  intervenous contrast.  This exam was performed using one or more of the following dose  reduction techniques:  Automated exposure control, adjustment of the RUSSELL and/or KV according  to patient's size, and/or use of iterative reconstruction technique.    FINDINGS:   Ventricles and sulci are symmetric. The gray-white matter  differentiation throughout the brain is well maintained. There is no  evidence of intracranial mass or hemorrhage. Visualized portions of  the paranasal sinuses and mastoid air cells are well aerated. There is  no evidence of skull fracture.      Impression    IMPRESSION: Negative Head CT    JEFF NANCE MD         SYSTEM ID:  [id number]   UA with Microscopic reflex to Culture    Specimen: Urine, Midstream   Result Value Ref Range    Color Urine Yellow Colorless, Straw, Light Yellow, Yellow    Appearance Urine Slightly Cloudy (A) Clear    Glucose Urine Negative Negative mg/dL    Bilirubin Urine Negative Negative    Ketones Urine Negative Negative mg/dL    Specific Gravity Urine 1.011 1.003 - 1.035    Blood Urine Trace (A) Negative    pH Urine 6.5 4.7 - 8.0    Protein Albumin Urine Negative Negative mg/dL    Urobilinogen Urine Normal Normal, 2.0 mg/dL    Nitrite Urine Positive (A) Negative    Leukocyte Esterase Urine Large (A) Negative    Bacteria Urine Many (A) None Seen /HPF     WBC Clumps Urine Present (A) None Seen /HPF    Mucus Urine Present (A) None Seen /LPF    RBC Urine 16 (H) <=2 /HPF    WBC Urine 98 (H) <=5 /HPF    Squamous Epithelials Urine 0 <=1 /HPF    Hyaline Casts Urine 4 (H) <=2 /LPF    Narrative    Urine Culture ordered based on laboratory criteria   Troponin T, High Sensitivity   Result Value Ref Range    Troponin T, High Sensitivity 24 (H) <=14 ng/L       Medications   cefTRIAXone (ROCEPHIN) in lidocaine 1% (PF) for IM administration 1 g (1 g Intramuscular $Given 6/18/24 2040)       Assessments & Plan (with Medical Decision Making)     I have reviewed the nursing notes.    I have reviewed the findings, diagnosis, plan and need for follow up with the patient.    Summary:  Patient presents to the ER today for syncope.  Potential diagnosis which have been considered and evaluated include MI/NSTEMI, orthostasis, arrhythmia, electrolyte abnormality, as well as others. Many of these have been excluded using the various modalities and assessment as noted on the chart. At the present time, the diagnosis given seems to be the most likely syncope likely due to a UTI but incidental elevated troponin.  Upon arrival, vitals signs are normal.  The patient is alert and in no distress.  Patient does have dementia so is disoriented.  Trauma examination conducted showing no abnormalities.  Other than confusion, neurological examination normal.  ECG obtained showing no acute abnormality.  Lab work obtained showing WBC 9.4 with hemoglobin of 12.7.  Electrolytes and hepatic functions benign.  Has a BUN of 16.2 with a creatinine 1.14 and a GFR 48 which is at patient baseline.  UA positive for nitrates, large leukocyte esterase, many bacteria.  Microscopic shows 98 WBC's, 16 RBC's, and 0 squamous epithelial cells.  High-sensitivity troponin 23.  With normal ECG and no chest pain did 2-hour repeat which was 24.  CT was conducted due to syncope which showed no acute intracerebral  abnormality.  Patient with UTI.  Ceftriaxone 1 g IM given in the ER.  Troponin did elevate from 23-24.  Discussed 4-hour repeat with POA but this was deferred.  Also discussed outpatient Zio patch monitoring for arrhythmia due to syncope.  This was also deferred by POA.  With no other acute findings will discharge patient home per POA request.  Will place on cephalexin 500 mg twice daily x 7 days.  Follow-up with PCP and return to ER if new or worsening symptoms.  Patient and family/POA verbalized understanding agree with plan of care.  Patient discharged home.        Critical Care Time: None     Impression and plan discussed with patient. Questions answered, concerns addressed, indications for urgent re-evaluation reviewed, and  given. Patient/Parent/Caregiver agree with treatment plan and have no further questions at this time.  AVS provided at discharge.    This note was created by the Dragon Voice Dictation System. Inadvertent typographical errors, due to software recognition problems, may still exist.             New Prescriptions    CEPHALEXIN (KEFLEX) 500 MG CAPSULE    Take 1 capsule (500 mg) by mouth 2 times daily for 7 days       Final diagnoses:   UTI (urinary tract infection)   Syncope   Elevated troponin       6/18/2024   [facility]       Joseluis Pedersen, JESSE CNP  06/18/24 2048

## 2024-06-18 NOTE — ED TRIAGE NOTES
Pt presents via [facility] from [place] c/o a few fainting episodes. No trauma. Patient is A&O to baseline, hx dementia.

## 2024-06-19 LAB
ATRIAL RATE - MUSE: 60 BPM
DIASTOLIC BLOOD PRESSURE - MUSE: NORMAL MMHG
INTERPRETATION ECG - MUSE: NORMAL
P AXIS - MUSE: 51 DEGREES
PR INTERVAL - MUSE: 150 MS
QRS DURATION - MUSE: 110 MS
QT - MUSE: 484 MS
QTC - MUSE: 484 MS
R AXIS - MUSE: -21 DEGREES
SYSTOLIC BLOOD PRESSURE - MUSE: NORMAL MMHG
T AXIS - MUSE: 42 DEGREES
VENTRICULAR RATE- MUSE: 60 BPM

## 2024-06-19 NOTE — DISCHARGE INSTRUCTIONS
Antibiotic use:   An antibiotic was ordered to help fight the bacterial infection that was acquired.  You need to take this medication exactly as prescribed.  DO NOT stop taking this medication until the prescribed end date, even if you are feeling better.  This way the affecting bacteria in your body are killed off.   You should eat probiotic yogurt (with live cultures) or Kefir (similar to yogurt) while taking antibiotic to promote regrowth of good bacteria in your digestive tract, which can be depleted by antibiotics.  Birth control and antibiotics (if applicable):   Birth control pills contain estrogens. Some antibiotics cause the enzymes in the liver to increase the break-down of estrogens and thereby can decrease the levels of estrogens in the body and the effectiveness of the birth control medications.  This can result in unwanted pregnancy.  To be safe it is wise to use a back-up-method of birth control while you take, and for 7 days after finishing the antibiotic.  Coumadin and antibiotics (if applicable):   Finally, if on coumadin, let your coumadin prescriber know. You likely need to have your INR checked by your Primary Care Provider in 2-3 days. Contact your clinic for an appointment.         Follow-up with your primary care provider for reevaluation.  Contact your primary care provider if you have any questions or concerns.  Do not hesitate to return to the ER if any new or worsening symptoms.     Please read the attached instructions (if any).  They highlight more specific treatments and interventions for you at home.              Thank you for letting me participate in your care and wish you a fast and uneventful recovery,    Joseluis MOLINA CNP    Do not hesitate to contact me with questions or concerns.  [email or web address]

## 2024-06-20 LAB — BACTERIA UR CULT: ABNORMAL

## 2024-06-22 ENCOUNTER — HEALTH MAINTENANCE LETTER (OUTPATIENT)
Age: 79
End: 2024-06-22

## 2024-06-23 ENCOUNTER — HOSPITAL ENCOUNTER (EMERGENCY)
Facility: HOSPITAL | Age: 79
Discharge: HOME | End: 2024-06-23
Attending: FAMILY MEDICINE | Admitting: FAMILY MEDICINE
Payer: COMMERCIAL

## 2024-06-23 VITALS
BODY MASS INDEX: 19.49 KG/M2 | RESPIRATION RATE: 16 BRPM | WEIGHT: 113.54 LBS | OXYGEN SATURATION: 97 % | DIASTOLIC BLOOD PRESSURE: 70 MMHG | SYSTOLIC BLOOD PRESSURE: 139 MMHG | HEART RATE: 57 BPM | TEMPERATURE: 98.4 F

## 2024-06-23 DIAGNOSIS — R55 PRE-SYNCOPE: ICD-10-CM

## 2024-06-23 DIAGNOSIS — D64.9 ANEMIA, UNSPECIFIED TYPE: ICD-10-CM

## 2024-06-23 DIAGNOSIS — R00.1 BRADYCARDIA: ICD-10-CM

## 2024-06-23 LAB
ANION GAP SERPL CALCULATED.3IONS-SCNC: 12 MMOL/L (ref 7–15)
BASOPHILS # BLD AUTO: 0 10E3/UL (ref 0–0.2)
BASOPHILS NFR BLD AUTO: 0 %
BUN SERPL-MCNC: 23.1 MG/DL (ref 8–23)
CALCIUM SERPL-MCNC: 9.3 MG/DL (ref 8.8–10.2)
CHLORIDE SERPL-SCNC: 100 MMOL/L (ref 98–107)
CREAT SERPL-MCNC: 1.19 MG/DL (ref 0.51–0.95)
DEPRECATED HCO3 PLAS-SCNC: 27 MMOL/L (ref 22–29)
EGFRCR SERPLBLD CKD-EPI 2021: 46 ML/MIN/1.73M2
EOSINOPHIL # BLD AUTO: 0 10E3/UL (ref 0–0.7)
EOSINOPHIL NFR BLD AUTO: 0 %
ERYTHROCYTE [DISTWIDTH] IN BLOOD BY AUTOMATED COUNT: 14.6 % (ref 10–15)
GLUCOSE SERPL-MCNC: 143 MG/DL (ref 70–99)
HCT VFR BLD AUTO: 33.3 % (ref 35–47)
HGB BLD-MCNC: 10.9 G/DL (ref 11.7–15.7)
HOLD SPECIMEN: NORMAL
IMM GRANULOCYTES # BLD: 0 10E3/UL
IMM GRANULOCYTES NFR BLD: 0 %
LYMPHOCYTES # BLD AUTO: 0.8 10E3/UL (ref 0.8–5.3)
LYMPHOCYTES NFR BLD AUTO: 8 %
MCH RBC QN AUTO: 31.1 PG (ref 26.5–33)
MCHC RBC AUTO-ENTMCNC: 32.7 G/DL (ref 31.5–36.5)
MCV RBC AUTO: 95 FL (ref 78–100)
MONOCYTES # BLD AUTO: 0.6 10E3/UL (ref 0–1.3)
MONOCYTES NFR BLD AUTO: 6 %
NEUTROPHILS # BLD AUTO: 8.3 10E3/UL (ref 1.6–8.3)
NEUTROPHILS NFR BLD AUTO: 85 %
NRBC # BLD AUTO: 0 10E3/UL
NRBC BLD AUTO-RTO: 0 /100
PLATELET # BLD AUTO: 211 10E3/UL (ref 150–450)
POTASSIUM SERPL-SCNC: 3.8 MMOL/L (ref 3.4–5.3)
RBC # BLD AUTO: 3.5 10E6/UL (ref 3.8–5.2)
SODIUM SERPL-SCNC: 139 MMOL/L (ref 135–145)
WBC # BLD AUTO: 9.7 10E3/UL (ref 4–11)

## 2024-06-23 PROCEDURE — 99284 EMERGENCY DEPT VISIT MOD MDM: CPT

## 2024-06-23 PROCEDURE — 93010 ELECTROCARDIOGRAM REPORT: CPT | Performed by: INTERNAL MEDICINE

## 2024-06-23 PROCEDURE — 36415 COLL VENOUS BLD VENIPUNCTURE: CPT | Performed by: FAMILY MEDICINE

## 2024-06-23 PROCEDURE — 99284 EMERGENCY DEPT VISIT MOD MDM: CPT | Performed by: FAMILY MEDICINE

## 2024-06-23 PROCEDURE — 80048 BASIC METABOLIC PNL TOTAL CA: CPT | Performed by: FAMILY MEDICINE

## 2024-06-23 PROCEDURE — 85025 COMPLETE CBC W/AUTO DIFF WBC: CPT | Performed by: FAMILY MEDICINE

## 2024-06-23 PROCEDURE — 93005 ELECTROCARDIOGRAM TRACING: CPT

## 2024-06-23 ASSESSMENT — ACTIVITIES OF DAILY LIVING (ADL)
ADLS_ACUITY_SCORE: 36
ADLS_ACUITY_SCORE: 36

## 2024-06-23 NOTE — ED PROVIDER NOTES
History     Chief Complaint   Patient presents with    Altered Mental Status     HPI  Vanesa Fleming is a 79 year old female who   Presented to the ER with a chief complaint of presyncope.  Patient was a history of Alzheimer's dementia and history collected from the patient and family at the bedside as well as staff at the nursing home.  Staff stated she looked pale this morning and almost fainting but the patient is self is sitting in the bed laughing and joking feeling very well denies any concern or complaint.  She said I feel great.  Denies any headache or visual changes.  Denies any focal weakness or numbness anywhere.  Denies any chest pain or shortness of breath or palpitation.  Denies any nausea vomiting or diaphoresis.  Denies any abdominal pain diarrhea or constipation.  Denies any urinary symptoms.  She is currently on antibiotic for UTI.  Allergies:  No Known Allergies    Problem List:    Patient Active Problem List    Diagnosis Date Noted    Diverticular disease of colon 06/18/2024     Priority: Medium     Formatting of this note might be different from the original.   Sigmoid.      Disorder of bone and cartilage 06/18/2024     Priority: Medium    Generalized anxiety disorder 06/18/2024     Priority: Medium    Hypertension 06/18/2024     Priority: Medium    Hypothyroidism 06/18/2024     Priority: Medium    Cognitive deficits following cerebrovascular disease 03/20/2018     Priority: Medium    ACP (advance care planning) 04/05/2017     Priority: Medium     Advance Care Planning 4/5/2017: Receipt of ACP document:  Received: Health Care Directive which was witnessed or notarized on [id number].  Document not previously scanned.  Reviewed for validity as medical order and sent to be scanned.  Code Status reflects choices in most recent ACP document..  Confirmed/documented designated decision maker(s).  Added by Eli Jaimes        Benign essential hypertension 01/24/2017     Priority: Medium    Mild  recurrent major depression (H24) 2017     Priority: Medium    Mild cognitive impairment with memory loss 2017     Priority: Medium    Elevated LDL cholesterol level 2017     Priority: Medium    CVA (cerebral vascular accident) (H) 2017     Priority: Medium    TIA (transient ischemic attack) 2017     Priority: Medium        Past Medical History:    Past Medical History:   Diagnosis Date    Anxiety state     Diverticulosis of colon     Essential hypertension     Gout     Hypothyroidism     Malignant neoplasm of breast (female) (H)     Obesity     Other and unspecified hyperlipidemia     Other chronic nonalcoholic liver disease        Past Surgical History:    Past Surgical History:   Procedure Laterality Date    COLONOSCOPY      05    LAPAROSCOPY DIAGNOSTIC (GYN)      with tubal banding    OTHER SURGICAL HISTORY      02,480215,BIOPSY BREAST,Left breast biopsy for a 3 centimeter left breast cancer    OTHER SURGICAL HISTORY      02,XMS538,LYMPH NODE DISSECTION,Left lumpectomy with axillary node dissection    PHACOEMULSIFICATION WITH STANDARD INTRAOCULAR LENS IMPLANT Right 2018    Procedure: PHACOEMULSIFICATION WITH STANDARD INTRAOCULAR LENS IMPLANT;  CATARACT EXTRACTION RIGHT EYE WITH IMPLANT;  Surgeon: Andrews Oglesby MD;  Location: [facility]    PHACOEMULSIFICATION WITH STANDARD INTRAOCULAR LENS IMPLANT Left 2018    Procedure: PHACOEMULSIFICATION WITH STANDARD INTRAOCULAR LENS IMPLANT;  CATARACT EXTRACTION LEFT EYE WITH IMPLANT;  Surgeon: Andrews Oglesby MD;  Location: [facility] OR       Family History:    Family History   Problem Relation Age of Onset    Heart Disease Mother         Heart Disease, age 63 of myocardial infarction,    Diabetes Mother         Diabetes    Hypertension Mother         Hypertension    Thyroid Disease Mother         Thyroid Disease    Other - See Comments Father         Alzheimer's    Substance Abuse Maternal Uncle          Alcohol/Drug,Chemical dependency    Other - See Comments Son         Ankylosing spondylitis    Colon Cancer Brother         Cancer-colon,Colon cancer age 58    Prostate Cancer Brother         Cancer-prostate    Hypertension Brother         Hypertension    Hypertension Sister         Hypertension    Seizure Disorder Sister         Seizures    Other - See Comments Sister         Psychiatric illness,depression    Other - See Comments Maternal Aunt         Psychiatric illness       Social History:  Marital Status:   [5]  Social History     Tobacco Use    Smoking status: Former    Smokeless tobacco: Never   Substance Use Topics    Alcohol use: Not Currently    Drug use: Unknown     Types: Other     Comment: Drug use: Not Asked        Medications:    ALLOPURINOL PO  aspirin 81 MG EC tablet  atorvastatin (LIPITOR) 80 MG tablet  BUSPIRONE HCL PO  cephALEXin (KEFLEX) 500 MG capsule  donepezil (ARICEPT) 5 MG tablet  furosemide (LASIX) 40 MG tablet  LEVOTHYROXINE SODIUM PO  MECLIZINE HCL PO  memantine (NAMENDA) 5 MG tablet  METOPROLOL SUCCINATE ER PO  Multiple Vitamins-Minerals (PRESERVISION AREDS PO)  multivitamin, therapeutic (THERA-VIT) TABS tablet  naproxen sodium (ANAPROX) 220 MG tablet  SERTRALINE HCL PO  traZODone (DESYREL) 50 MG tablet  acetaminophen (TYLENOL) 325 MG tablet          Review of Systems   All other systems reviewed and are negative.      Physical Exam   BP: 138/64  Pulse: 55  Temp: 97.6  F (36.4  C)  Resp: 16  Weight: 51.5 kg (113 lb 8.6 oz)  SpO2: 96 %      Physical Exam  Constitutional:       General: She is not in acute distress.     Appearance: Normal appearance. She is well-developed. She is not ill-appearing, toxic-appearing or diaphoretic.   HENT:      Head: Normocephalic and atraumatic.      Nose: Nose normal. No congestion or rhinorrhea.      Mouth/Throat:      Pharynx: No oropharyngeal exudate or posterior oropharyngeal erythema.   Eyes:      General: No scleral icterus.        Right eye:  No discharge.         Left eye: No discharge.      Extraocular Movements: Extraocular movements intact.      Conjunctiva/sclera: Conjunctivae normal.      Pupils: Pupils are equal, round, and reactive to light.   Neck:      Vascular: No carotid bruit.   Cardiovascular:      Rate and Rhythm: Normal rate and regular rhythm.      Heart sounds: Normal heart sounds. No murmur heard.  Pulmonary:      Effort: Pulmonary effort is normal. No respiratory distress.      Breath sounds: Normal breath sounds. No stridor. No wheezing, rhonchi or rales.   Chest:      Chest wall: No tenderness.   Abdominal:      General: Abdomen is flat. Bowel sounds are normal. There is no distension.      Palpations: Abdomen is soft. There is no mass.      Tenderness: There is no abdominal tenderness. There is no right CVA tenderness, left CVA tenderness, guarding or rebound.      Hernia: No hernia is present.   Musculoskeletal:         General: No swelling, tenderness, deformity or signs of injury.      Cervical back: Normal range of motion and neck supple. No rigidity or tenderness.      Right lower leg: No edema.      Left lower leg: No edema.   Lymphadenopathy:      Cervical: No cervical adenopathy.   Skin:     General: Skin is warm and dry.      Coloration: Skin is not jaundiced or pale.      Findings: No bruising, erythema, lesion or rash.   Neurological:      General: No focal deficit present.      Mental Status: She is alert. Mental status is at baseline.      Cranial Nerves: No cranial nerve deficit.      Sensory: No sensory deficit.      Motor: No weakness.      Coordination: Coordination normal.      Gait: Gait normal.      Deep Tendon Reflexes: Reflexes normal.      Comments: NIH stroke scale of 0   Psychiatric:         Mood and Affect: Mood normal.         ED Course       Patient was seen and examined shortly after arrival.  Stable.  Lab reviewed.  Hemoglobin 10.9.  Baseline around 11.9 denies bleeding anywhere.  EKG shows sinus  bradycardia.  No sign of acute ischemia or dysrhythmia.  Rate in the upper 50s.  Patient is completely asymptomatic.  Feeling very well.  Unclear etiology of her symptoms.  No sign of acute coronary syndrome, CVA, severe sepsis or any serious illness at this point.  Her symptoms could be related to anemia or possible dysrhythmia versus vasovagal or orthostatic dizziness.  I did send a Zio patch and patient was advised to  Rest and stable hydrated  Zio patch was ordered  Close follow-up with PCP  Come back for any concern or any worsening symptoms  Patient and her family at the bedside agrees with the plan.  Stable for discharge.       Procedures                Results for orders placed or performed during the hospital encounter of 06/23/24 (from the past 24 hour(s))   EKG 12-lead, tracing only   Result Value Ref Range    Systolic Blood Pressure  mmHg    Diastolic Blood Pressure  mmHg    Ventricular Rate 57 BPM    Atrial Rate 57 BPM    FL Interval 146 ms    QRS Duration 108 ms     ms    QTc 469 ms    P Axis 28 degrees    R AXIS -25 degrees    T Axis 32 degrees    Interpretation ECG       Sinus bradycardia  Moderate voltage criteria for LVH, may be normal variant ( R in aVL , Karl product )  Nonspecific T wave abnormality  Abnormal ECG  When compared with ECG of 18-JUN-2024 18:01,  No significant change was found     CBC with platelets differential    Narrative    The following orders were created for panel order CBC with platelets differential.  Procedure                               Abnormality         Status                     ---------                               -----------         ------                     CBC with platelets and d...[[id number]]  Abnormal            Final result                 Please view results for these tests on the individual orders.   Basic metabolic panel   Result Value Ref Range    Sodium 139 135 - 145 mmol/L    Potassium 3.8 3.4 - 5.3 mmol/L    Chloride 100 98 - 107 mmol/L     Carbon Dioxide (CO2) 27 22 - 29 mmol/L    Anion Gap 12 7 - 15 mmol/L    Urea Nitrogen 23.1 (H) 8.0 - 23.0 mg/dL    Creatinine 1.19 (H) 0.51 - 0.95 mg/dL    GFR Estimate 46 (L) >60 mL/min/1.73m2    Calcium 9.3 8.8 - 10.2 mg/dL    Glucose 143 (H) 70 - 99 mg/dL   CBC with platelets and differential   Result Value Ref Range    WBC Count 9.7 4.0 - 11.0 10e3/uL    RBC Count 3.50 (L) 3.80 - 5.20 10e6/uL    Hemoglobin 10.9 (L) 11.7 - 15.7 g/dL    Hematocrit 33.3 (L) 35.0 - 47.0 %    MCV 95 78 - 100 fL    MCH 31.1 26.5 - 33.0 pg    MCHC 32.7 31.5 - 36.5 g/dL    RDW 14.6 10.0 - 15.0 %    Platelet Count 211 150 - 450 10e3/uL    % Neutrophils 85 %    % Lymphocytes 8 %    % Monocytes 6 %    % Eosinophils 0 %    % Basophils 0 %    % Immature Granulocytes 0 %    NRBCs per 100 WBC 0 <1 /100    Absolute Neutrophils 8.3 1.6 - 8.3 10e3/uL    Absolute Lymphocytes 0.8 0.8 - 5.3 10e3/uL    Absolute Monocytes 0.6 0.0 - 1.3 10e3/uL    Absolute Eosinophils 0.0 0.0 - 0.7 10e3/uL    Absolute Basophils 0.0 0.0 - 0.2 10e3/uL    Absolute Immature Granulocytes 0.0 <=0.4 10e3/uL    Absolute NRBCs 0.0 10e3/uL   Extra Tube    Narrative    The following orders were created for panel order Extra Tube.  Procedure                               Abnormality         Status                     ---------                               -----------         ------                     Extra Blue Top Tube[[id number]]                              In process                 Extra Red Top Tube[[id number]]                               In process                 Extra Heparinized Syringe[[id number]]                        Final result                 Please view results for these tests on the individual orders.   Extra Heparinized Syringe   Result Value Ref Range    Hold Specimen Ok        Medications - No data to display    Assessments & Plan (with Medical Decision Making)     I have reviewed the nursing notes.    I have reviewed the findings, diagnosis, plan and  need for follow up with the patient.        New Prescriptions    No medications on file       Final diagnoses:   Pre-syncope   Bradycardia   Anemia, unspecified type       6/23/2024   [facility]       Marjorie Ambriz MD  06/23/24 [id number]

## 2024-06-23 NOTE — DISCHARGE INSTRUCTIONS
Rest and stable hydrated  Zio patch was ordered  Close follow-up with PCP  Come back for any concern or any worsening symptoms

## 2024-06-23 NOTE — ED NOTES
"Patient arrived by [facility]. Coming from home facility [facility] per staffs concerns of \"not being herself\". Denies any pain, responds appropriately with smile on face, with no complaints. Alert and oriented to baseline. Patient recently diagnosed with UTI and on antibiotics currently. Changed into gown, vitals taken, monitors on. Call light within reach.  "

## 2024-06-24 ENCOUNTER — ORDERS ONLY (AUTO-RELEASED) (OUTPATIENT)
Dept: EMERGENCY MEDICINE | Facility: HOSPITAL | Age: 79
End: 2024-06-24

## 2024-06-24 DIAGNOSIS — R55 PRE-SYNCOPE: ICD-10-CM

## 2024-06-24 LAB
ATRIAL RATE - MUSE: 57 BPM
DIASTOLIC BLOOD PRESSURE - MUSE: NORMAL MMHG
INTERPRETATION ECG - MUSE: NORMAL
P AXIS - MUSE: 28 DEGREES
PR INTERVAL - MUSE: 146 MS
QRS DURATION - MUSE: 108 MS
QT - MUSE: 482 MS
QTC - MUSE: 469 MS
R AXIS - MUSE: -25 DEGREES
SYSTOLIC BLOOD PRESSURE - MUSE: NORMAL MMHG
T AXIS - MUSE: 32 DEGREES
VENTRICULAR RATE- MUSE: 57 BPM

## 2025-03-02 ENCOUNTER — HEALTH MAINTENANCE LETTER (OUTPATIENT)
Age: 80
End: 2025-03-02

## 2025-04-17 NOTE — OP NOTE
DATE OF SERVICE: 5/17/18      PREOPERATIVE DIAGNOSIS:     Cataract, Right eye.       POSTOPERATIVE DIAGNOSIS:  Cataract, Right eye.       PROCEDURE:  Phacoemulsification with intraocular lens implant, Model PCB00, 18.5 diopter power.       ANESTHESIA:  Topical with IV sedation.         DESCRIPTION OF PROCEDURE:   Operative risks, benefits and alternatives to the procedure were discussed at length with the patient including loss of vision, loss of the eye.  Patient voiced understanding and informed consent was signed.  The patient was taken to the operating suite, where an appropriate level of anesthesia was given.  Attention was placed to the right eye.  The patient was then prepped and draped in the usual sterile ophthalmic manner.  A lid speculum was placed in the eye to provide exposure and MVR blade was used to make a paracentesis.  Once this was performed, Shugarcaine was then placed intracamerally.  This was then followed by intracameral Viscoat.  A 2.75 mm blade was then used to make a biplanar temporal clear corneal incision.  A cystotome and uttrata were used to make a continuous curvilinear capsulorrhexis.  BSS on Lopez cannula was then used to hydrodissect the lens.  Phacoemulsification was then performed in a divide-and-conquer technique to remove all pieces of the nucleus.  Irrigation aspiration was then used to remove all remaining cortical material and debris.  Amvisc was then used to fill the capsular bag.  A PCB00 18.5 diopter lens was then injected into the capsular bag using the injector.  Once this was performed, a Goldberg secondary instrument was used to rotate the lens into proper position.  Irrigation aspiration was then used to remove all remaining viscoelastic material and center the lens appropriately.  BSS on 30 gauge cannula was then used to hydrate both wounds.  A Weck-Eunice was used to assess intraocular IOP.  Once this was stable and the lens was found to be in good position, the  patient was undraped.  The patient was brought to the recovery area in stable condition.  Family was made aware of the patient's condition and the patient will follow up with us later this afternoon.  No complications.           Andrews Oglesby MD       Detail Level: Detailed Medical Necessity Information: It is in your best interest to select a reason for this procedure from the list below. All of these items fulfill various CMS LCD requirements except the new and changing color options. Render Note In Bullet Format When Appropriate: No Spray Paint Text: The liquid nitrogen was applied to the skin utilizing a spray paint frosting technique. Post-Care Instructions: I reviewed with the patient in detail post-care instructions. Patient is to wear sunprotection, and avoid picking at any of the treated lesions. Pt may apply Vaseline to crusted or scabbing areas. Show Spray Paint Technique Variable?: Yes Consent: The patient's consent was obtained including but not limited to risks of crusting, scabbing, blistering, scarring, darker or lighter pigmentary change, recurrence, incomplete removal and infection. Medical Necessity Clause: This procedure was medically necessary because the lesions that were treated were: Number Of Freeze-Thaw Cycles: 2 freeze-thaw cycles

## 2025-06-10 DIAGNOSIS — I10 BENIGN ESSENTIAL HYPERTENSION: Primary | ICD-10-CM

## 2025-06-12 RX ORDER — METOPROLOL SUCCINATE 25 MG/1
25 TABLET, EXTENDED RELEASE ORAL DAILY
Qty: 28 TABLET | Refills: 0 | Status: SHIPPED | OUTPATIENT
Start: 2025-06-12

## 2025-06-12 NOTE — TELEPHONE ENCOUNTER
Toprol XL      Last Written Prescription Date:  5.14.25  Last Fill Quantity: #28,   # refills: 0  Last Office Visit:   Future Office visit:       Routing refill request to provider for review/approval because:  Medication is reported/historical

## 2025-06-16 DIAGNOSIS — I10 BENIGN ESSENTIAL HYPERTENSION: Primary | ICD-10-CM

## 2025-06-16 RX ORDER — FUROSEMIDE 20 MG/1
10 TABLET ORAL
Qty: 30 TABLET | Refills: 0 | Status: SHIPPED | OUTPATIENT
Start: 2025-06-16

## 2025-06-16 NOTE — TELEPHONE ENCOUNTER
Lasix       Last Written Prescription Date:  5.20.25  Last Fill Quantity: #30,   # refills: 0  Last Office Visit: ?  Future Office visit:       Routing refill request to provider for review/approval because:

## 2025-07-12 ENCOUNTER — HEALTH MAINTENANCE LETTER (OUTPATIENT)
Age: 80
End: 2025-07-12

## 2025-07-15 DIAGNOSIS — G31.84 MILD COGNITIVE IMPAIRMENT WITH MEMORY LOSS: Primary | Chronic | ICD-10-CM

## 2025-07-15 DIAGNOSIS — F41.1 GENERALIZED ANXIETY DISORDER: ICD-10-CM

## 2025-07-15 DIAGNOSIS — I10 BENIGN ESSENTIAL HYPERTENSION: ICD-10-CM

## 2025-07-15 RX ORDER — FUROSEMIDE 20 MG/1
10 TABLET ORAL
Qty: 30 TABLET | Refills: 0 | Status: SHIPPED | OUTPATIENT
Start: 2025-07-15

## 2025-07-15 RX ORDER — METOPROLOL SUCCINATE 25 MG/1
25 TABLET, EXTENDED RELEASE ORAL DAILY
Qty: 28 TABLET | Refills: 0 | Status: SHIPPED | OUTPATIENT
Start: 2025-07-15

## 2025-07-15 RX ORDER — BUSPIRONE HYDROCHLORIDE 10 MG/1
10 TABLET ORAL 2 TIMES DAILY
Qty: 56 TABLET | Refills: 0 | Status: SHIPPED | OUTPATIENT
Start: 2025-07-15

## 2025-07-15 RX ORDER — DONEPEZIL HYDROCHLORIDE 10 MG/1
10 TABLET, FILM COATED ORAL AT BEDTIME
Qty: 28 TABLET | Refills: 0 | Status: SHIPPED | OUTPATIENT
Start: 2025-07-15

## 2025-07-15 NOTE — TELEPHONE ENCOUNTER
Buspar  Last Written Prescription Date: not prescribed  Last Fill Quantity: ? # of Refills: ?  Last Office Visit: ?    Aricept  Last Written Prescription Date: not prescribed  Last Fill Quantity: ? # of Refills: ?  Last Office Visit: ?    Lasix  Last Written Prescription Date: 6/16/25  Last Fill Quantity: 30 # of Refills: 0  Last Office Visit: ?    Metoprolol  Last Written Prescription Date: 6/12/25  Last Fill Quantity: 28 # of Refills: 0  Last Office Visit: ?

## 2025-07-15 NOTE — TELEPHONE ENCOUNTER
Beta-Blockers Protocol Gwqewx78/15/2025 09:06 AM   Protocol Details Most recent blood pressure under 140/90 in past 12 months    Recent (12 month) or future (90 days) visit with authorizing provider's specialty (provided they have been seen in the past 15 months)          Diuretics (Including Combos) Protocol Bslxxp07/15/2025 09:06 AM   Protocol Details Most recent blood pressure under 140/90 in past 12 months    Potassium level on file in past 12 months    Has GFR on file in past 12 months and most recent value is normal    Recent (12 month) or future (90 days) visit with authorizing provider's specialty (provided they have been seen in the past 15 months)          Miscellaneous Dementia Agents Oircbj58/15/2025 09:06 AM   Protocol Details Medication is active on med list and the sig matches. RN to manually verify dose and sig if red X/fail.    Recent (12 month) or future (90 days) visit with authorizing provider's specialty (provided they have been seen in the past 15 months)    Medication indicated for associated diagnosis          Atypical Antidepressants Protocol Vfyrwj41/15/2025 09:06 AM   Protocol Details Medication is active on med list and the sig matches. RN to manually verify dose and sig if red X/fail.    YULIANA-7 score of less than 5 in past 6 months.    Recent (12 month) or future (90 days) visit with authorizing provider's specialty (provided they have been seen in the past 15 months)

## 2025-08-06 DIAGNOSIS — G31.84 MILD COGNITIVE IMPAIRMENT WITH MEMORY LOSS: Chronic | ICD-10-CM

## 2025-08-06 DIAGNOSIS — I10 BENIGN ESSENTIAL HYPERTENSION: ICD-10-CM

## 2025-08-06 DIAGNOSIS — F41.1 GENERALIZED ANXIETY DISORDER: ICD-10-CM

## 2025-08-07 RX ORDER — BUSPIRONE HYDROCHLORIDE 10 MG/1
10 TABLET ORAL 2 TIMES DAILY
Qty: 56 TABLET | Refills: 0 | Status: SHIPPED | OUTPATIENT
Start: 2025-08-07

## 2025-08-07 RX ORDER — DONEPEZIL HYDROCHLORIDE 10 MG/1
10 TABLET, FILM COATED ORAL AT BEDTIME
Qty: 28 TABLET | Refills: 0 | Status: SHIPPED | OUTPATIENT
Start: 2025-08-07

## 2025-08-07 RX ORDER — METOPROLOL SUCCINATE 25 MG/1
25 TABLET, EXTENDED RELEASE ORAL DAILY
Qty: 28 TABLET | Refills: 0 | Status: SHIPPED | OUTPATIENT
Start: 2025-08-07

## (undated) DEVICE — LABEL-STERILE PREPRINTED FOR OR

## (undated) DEVICE — BIN-TECNIS DCB00 LENSES

## (undated) DEVICE — DRSG-TEGADERM MEDIUM #1626

## (undated) DEVICE — GLV-7.0 PROTEXIS PI CLASSIC LF/PF

## (undated) DEVICE — KNIFE-MVR 20GA/45 DEGREE ANGLED

## (undated) DEVICE — PACK-EYE-CUSTOM

## (undated) DEVICE — HANDPIECE-CAPSULEGUARD I/A STELLARIS

## (undated) DEVICE — PACK-PHACO STELLARIS

## (undated) DEVICE — BETADINE 5% STERILE OPHTHALMIC SOLUTION 1 OZ.

## (undated) DEVICE — BIN-LENS IMPLANT CART

## (undated) DEVICE — GLV-7.5 PROTEXIS PI CLASSIC LF/PF

## (undated) DEVICE — INSTRUMENT WIPE-VISIWIPE

## (undated) DEVICE — BIN-CATARACT BIN

## (undated) DEVICE — CYSTOTOME-IRRIGATING  25G

## (undated) DEVICE — KNIFE-LASEREDGE CLEAR CORNEAL 2.75MM ANGLED DOUBLE BEVEL

## (undated) DEVICE — SENSOR-OXISENSOR II ADULT